# Patient Record
Sex: FEMALE
[De-identification: names, ages, dates, MRNs, and addresses within clinical notes are randomized per-mention and may not be internally consistent; named-entity substitution may affect disease eponyms.]

---

## 2012-07-08 VITALS — DIASTOLIC BLOOD PRESSURE: 50 MMHG | SYSTOLIC BLOOD PRESSURE: 147 MMHG

## 2019-12-09 ENCOUNTER — HOSPITAL ENCOUNTER (OUTPATIENT)
Dept: HOSPITAL 97 - ER | Age: 84
Setting detail: OBSERVATION
LOS: 1 days | Discharge: NURSING, REHAB OR LONG-TERM CARE | End: 2019-12-10
Attending: FAMILY MEDICINE | Admitting: FAMILY MEDICINE
Payer: COMMERCIAL

## 2019-12-09 VITALS — BODY MASS INDEX: 38 KG/M2

## 2019-12-09 DIAGNOSIS — I69.351: ICD-10-CM

## 2019-12-09 DIAGNOSIS — Z79.01: ICD-10-CM

## 2019-12-09 DIAGNOSIS — R07.9: Primary | ICD-10-CM

## 2019-12-09 DIAGNOSIS — N18.2: ICD-10-CM

## 2019-12-09 DIAGNOSIS — E78.5: ICD-10-CM

## 2019-12-09 DIAGNOSIS — I25.10: ICD-10-CM

## 2019-12-09 DIAGNOSIS — Z88.0: ICD-10-CM

## 2019-12-09 DIAGNOSIS — I48.91: ICD-10-CM

## 2019-12-09 DIAGNOSIS — I50.32: ICD-10-CM

## 2019-12-09 DIAGNOSIS — I13.0: ICD-10-CM

## 2019-12-09 DIAGNOSIS — Z95.5: ICD-10-CM

## 2019-12-09 LAB
ALBUMIN SERPL BCP-MCNC: 1.6 G/DL (ref 3.4–5)
ALP SERPL-CCNC: 111 U/L (ref 45–117)
ALT SERPL W P-5'-P-CCNC: 28 U/L (ref 12–78)
ANISOCYTOSIS BLD QL: (no result)
AST SERPL W P-5'-P-CCNC: 66 U/L (ref 15–37)
BLD SMEAR INTERP: (no result)
BUN BLD-MCNC: 73 MG/DL (ref 7–18)
CKMB CREATINE KINASE MB: 2.2 NG/ML (ref 0.3–3.6)
GLUCOSE SERPLBLD-MCNC: 111 MG/DL (ref 74–106)
HCT VFR BLD CALC: 31 % (ref 36–45)
INR BLD: 2.33
LIPASE SERPL-CCNC: 200 U/L (ref 73–393)
LYMPHOCYTES # SPEC AUTO: 0.6 K/UL (ref 0.7–4.9)
MAGNESIUM SERPL-MCNC: 2.4 MG/DL (ref 1.8–2.4)
MORPHOLOGY BLD-IMP: (no result)
PMV BLD: 7.8 FL (ref 7.6–11.3)
POIKILOCYTOSIS BLD QL SMEAR: (no result)
POTASSIUM SERPL-SCNC: 4 MMOL/L (ref 3.5–5.1)
RBC # BLD: 3.8 M/UL (ref 3.86–4.86)
TROPONIN (EMERG DEPT USE ONLY): 0.09 NG/ML (ref 0–0.04)
TROPONIN I: 0.08 NG/ML (ref 0–0.04)

## 2019-12-09 PROCEDURE — 36415 COLL VENOUS BLD VENIPUNCTURE: CPT

## 2019-12-09 PROCEDURE — 84484 ASSAY OF TROPONIN QUANT: CPT

## 2019-12-09 PROCEDURE — 87186 SC STD MICRODIL/AGAR DIL: CPT

## 2019-12-09 PROCEDURE — 83880 ASSAY OF NATRIURETIC PEPTIDE: CPT

## 2019-12-09 PROCEDURE — 82553 CREATINE MB FRACTION: CPT

## 2019-12-09 PROCEDURE — 80048 BASIC METABOLIC PNL TOTAL CA: CPT

## 2019-12-09 PROCEDURE — 85025 COMPLETE CBC W/AUTO DIFF WBC: CPT

## 2019-12-09 PROCEDURE — 87088 URINE BACTERIA CULTURE: CPT

## 2019-12-09 PROCEDURE — 87077 CULTURE AEROBIC IDENTIFY: CPT

## 2019-12-09 PROCEDURE — 99285 EMERGENCY DEPT VISIT HI MDM: CPT

## 2019-12-09 PROCEDURE — 81003 URINALYSIS AUTO W/O SCOPE: CPT

## 2019-12-09 PROCEDURE — 85610 PROTHROMBIN TIME: CPT

## 2019-12-09 PROCEDURE — 82947 ASSAY GLUCOSE BLOOD QUANT: CPT

## 2019-12-09 PROCEDURE — 87086 URINE CULTURE/COLONY COUNT: CPT

## 2019-12-09 PROCEDURE — 84443 ASSAY THYROID STIM HORMONE: CPT

## 2019-12-09 PROCEDURE — 80076 HEPATIC FUNCTION PANEL: CPT

## 2019-12-09 PROCEDURE — 83735 ASSAY OF MAGNESIUM: CPT

## 2019-12-09 PROCEDURE — 80061 LIPID PANEL: CPT

## 2019-12-09 PROCEDURE — 93306 TTE W/DOPPLER COMPLETE: CPT

## 2019-12-09 PROCEDURE — 71045 X-RAY EXAM CHEST 1 VIEW: CPT

## 2019-12-09 PROCEDURE — 82550 ASSAY OF CK (CPK): CPT

## 2019-12-09 PROCEDURE — 93005 ELECTROCARDIOGRAM TRACING: CPT

## 2019-12-09 PROCEDURE — 83690 ASSAY OF LIPASE: CPT

## 2019-12-09 RX ADMIN — AMIODARONE HYDROCHLORIDE SCH MG: 200 TABLET ORAL at 22:32

## 2019-12-09 RX ADMIN — ACETAMINOPHEN PRN MG: 500 TABLET, FILM COATED ORAL at 22:32

## 2019-12-09 RX ADMIN — APIXABAN SCH MG: 2.5 TABLET, FILM COATED ORAL at 22:32

## 2019-12-09 NOTE — ER
Nurse's Notes                                                                                     

 [facility]                                                                 

Name: Nic Plummer                                                                             

Age: 84 yrs                                                                                       

Sex: Female                                                                                       

: 1935                                                                                   

MRN: [id number]                                                                                   

Arrival Date: 2019                                                                          

Time: 13:35                                                                                       

Account#: [id number]                                                                            

Bed 26                                                                                            

Private MD:                                                                                       

Diagnosis: Chest pain, unspecified;Angina pectoris;Anemia, unspecified;Unspecified kidney         

  failure;Urinary tract infection, site not specified                                             

                                                                                                  

Presentation:                                                                                     

                                                                                             

13:38 Presenting complaint: EMS states: Chest pain that began 2 hours ago. Pt had 3 cardiac   ss  

      stents placed 3 weeks ago and had a CVA just after that procedure was completed.            

      Nursing home staff administered Nitro x 3 without relief. Transition of care: patient       

      was received from another setting of care (long-[facility] care [facility]), [facility]. Onset of symptoms was 2019 at 11:54. Risk Assessment: Do you want      

      to hurt yourself or someone else? Patient reports no desire to harm self or others.         

      Initial Sepsis Screen: Does the patient meet any 2 criteria? No. Patient's initial          

      sepsis screen is negative. Does the patient have a suspected source of infection? No.       

      Patient's initial sepsis screen is negative. Care prior to arrival: SEE TRIAGE NOTE.        

      Failed peripheral access x 1.                                                               

13:38 Method Of Arrival: EMS: [facility]                                                         

13:38 Acuity: KEMAR 2                                                                           ss  

                                                                                                  

Historical:                                                                                       

- Allergies:                                                                                      

13:38 PENICILLINS;                                                                            ss  

- Home Meds:                                                                                      

13:45 amiodarone 200 mg Oral tab 1 tab 2 times per day [Active]; apixaban oral 2.5 mg oral 1  ss  

      tab 2 times per day [Active]; aspirin 81 mg Oral TbEC 1 tab once daily [Active]; Coreg      

      6.25 mg Oral tab 1 tab 2 times per day [Active]; Lipitor 80 mg Oral tab 1 tab once          

      daily [Active]; nitroglycerin 0.4 mg SL subl 1 tab every 5 minutes [Active]; Nystatin       

      Powder [Active]; Tylenol [Active];                                                          

- PMHx:                                                                                           

13:38 CVA; residual R sided weakness; CKD; Atrial Fib; CHF;                                   ss  

- PSHx:                                                                                           

13:38 Heart stents; Knee surgery;                                                             ss  

                                                                                                  

- Immunization history:: Adult Immunizations up to date.                                          

- Social history:: Smoking status: Patient/guardian denies using tobacco.                         

- Ebola Screening: : Patient denies exposure to infectious person Patient denies travel           

  to an Ebola-affected area in the 21 days before illness onset.                                  

- Family history:: not pertinent.                                                                 

                                                                                                  

                                                                                                  

Screening:                                                                                        

15:05 Abuse screen: Denies threats or abuse. Denies injuries from another. Nutritional        rv  

      screening: No deficits noted. Tuberculosis screening: No symptoms or risk factors           

      identified. Fall Risk None identified.                                                      

                                                                                                  

Assessment:                                                                                       

15:02 General: Appears in no apparent distress. Behavior is calm, cooperative. Pain:          rv  

      Complains of pain in chest Pain does not radiate. Pain began suddenly. Neuro: Level of      

      Consciousness is awake, alert, obeys commands, Oriented to person, place, time,             

      situation. Cardiovascular: Patient's skin is warm and dry. Rhythm is atrial                 

      fibrillation. Respiratory: Airway is patent. GI: No signs and/or symptoms were reported     

      involving the gastrointestinal system. : No signs and/or symptoms were reported           

      regarding the genitourinary system. EENT: No signs and/or symptoms were reported            

      regarding the EENT system. Derm: Skin is intact.                                            

18:31 Reassessment: Patient appears in no apparent distress at this time. Patient and/or      rv  

      family updated on plan of care and expected duration. Pain level reassessed. Patient is     

      alert, oriented x 3, equal unlabored respirations, skin warm/dry/pink.                      

                                                                                                  

Vital Signs:                                                                                      

13:38 BP 96 / 69 LA (auto/reg); Pulse 74; Resp 25 S; Temp 97(A); Pulse Ox 100% on R/A; Pain   jp3 

      5/10;                                                                                       

15:03  / 72; Pulse 75; Resp 14 S; Pulse Ox 100% on R/A;                                 ca1 

16:49  / 57; Pulse 75; Resp 12 S; Pulse Ox 100% on R/A;                                 ca1 

17:30  / 44; Pulse 76; Resp 11; Pulse Ox 100% on R/A;                                   rv  

18:15  / 58; Pulse 76; Resp 10; Pulse Ox 97% on R/A;                                    rv  

                                                                                                  

ED Course:                                                                                        

13:35 Patient arrived in ED.                                                                  ss  

13:37 Nicci Santos, RN is Primary Nurse.                                                      ca1 

13:38 Arm band placed on right wrist.                                                         ss  

13:38 Patient maintains SpO2 saturation greater than 95% on room air.                         jp3 

13:39 Placed in gown. Bed in low position. Call light in reach. Side rails up X 1. Side rails jp3 

      up X2. Adult w/ patient. Warm blanket given. Verbal reassurance given. Cardiac monitor      

      on. Pulse ox on. NIBP on.                                                                   

13:41 Triage completed.                                                                       ss  

13:42 Scott Henry MD is Attending Physician.                                             courtney 

13:53 Initial lab(s) drawn, by me, sent to lab. Urine collected: Noyola catheter specimen,     jp3 

      clear, sara colored, EKG done, by ED staff, reviewed by Scott Henry MD. Inserted        

      saline lock: 20 gauge in left antecubital area, using aseptic technique. Blood              

      collected.                                                                                  

14:12 XRAY Chest (1 view) Sent.                                                               jp3 

14:15 X-ray(s) taken.                                                                         jp3 

14:19 XRAY Chest (1 view) In Process Unspecified.                                             EDMS

15:05 Aureliano Beckett, RN is Primary Nurse.                                                  rv  

15:09 CBC Smear Scan Sent.                                                                    rv  

15:54 Scout Wood DO is Hospitalizing Provider.                                           courtney 

17:21 Head of bed elevated. Turned to left side.                                              jp3 

17:27 Pillow given. Diet tray given. Diet: Patient given a heart healthy meal tray.           jp3 

17:50 Diet: Patient given water.                                                              rv  

19:54 No provider procedures requiring assistance completed. Patient admitted, IV remains in  rv  

      place.                                                                                      

                                                                                                  

Administered Medications:                                                                         

No medications were administered                                                                  

                                                                                                  

                                                                                                  

Outcome:                                                                                          

15:56 Decision to Hospitalize by Provider.                                                    [facility] 

19:55 Admitted to Tele accompanied by tech, family with patient, via stretcher, room 403,     rv  

      with chart, Report called to  [facility]                                                    

19:55 Condition: good                                                                             

19:55 Instructed on the need for admit.                                                           

19:57 Patient left the ED.                                                                    rv  

                                                                                                  

Signatures:                                                                                       

Dispatcher [employer]                           EDMS                                                 

Scott Henry MD MD cha Smirch, Shelby, RN                      RN   ss                                                   

Aureliano Beckett, RN                    RN   rv                                                   

Wilfrido Lee                              jp3                                                  

Nicci Santos RN                        RN   ca1                                                  

                                                                                                  

**************************************************************************************************

## 2019-12-09 NOTE — XMS REPORT
Patient Summary Document

 Created on:2019



Patient:MARCUS STERLING

Sex:Female

:1935

External Reference #:[id number]





Demographics







 Address  [address]



   [place] [address]

 

 Home Phone  [phone]

 

 Work Phone  [phone]

 

 Preferred Language  Unknown

 

 Marital Status  Unknown

 

 Bahai Affiliation  Unknown

 

 Race  Unknown

 

 Additional Race(s)  Unavailable



   Unavailable

 

 Ethnic Group  Unknown









Author







 Organization  [facility]

 

 Address  [facility] [address]



   [place] [address]

 

 Phone  [phone]









Support







 Name  Relationship  Address  Phone

 

 ANABEL STERLING  Unavailable  [address]  [phone]



     [place] [address]  

 

 SANDI LOPEZ  Unavailable  [address]  [phone]



     [place] [address]  









Care Team Providers







 Name  Role  Phone

 

 Unavailable  Unavailable  Unavailable









Payers







 Payer Name  Policy Type  Policy Number  Effective Date  Expiration Date







Problems

This patient has no known problems.



Allergies, Adverse Reactions, Alerts







 Allergy Name  Allergy  Status  Severity  Reaction(s)  Onset  Inactive  
Treating  Comments



   Type        Date  Date  Clinician  

 

 Penicillins  DA  Active  MO    2019      



           00:00:0      



           0      

 

 Penicillins  DA  Active  MO    2019      



           00:00:0      



           0      







Medications

This patient has no known medications.



Results







 Test Description  Test Time  Test Comments  Text Results  Atomic Results  
Result Comments









 BASIC METABOLIC PANEL  2019 13:15:00    









   Test Item  Value  Reference Range  Comments









 SODIUM (test code=NA)  126 MMOL/L  137-145  

 

 POTASSIUM (test code=K)  4.8 MMOL/L  3.5-5.1  

 

 CHLORIDE (test code=CL)  90 MMOL/L    

 

 CARBON DIOXIDE (test code=CO2)  25 MMOL/L  22-30  

 

 GLUCOSE (test code=GLU)  135 MG/DL    

 

 BLOOD UREA NITROGEN (test  69 MG/DL  7-17  



 code=BUN)      

 

 GLOMERULAR FILTRATION RATE (test  24    Reporting units: ml/min/1.73 m2



 code=GFR)       (Modified MDRD



       Formula)Reference Range: >



       or=60 ml/min/1.73 m2

 

 CREATININE (test code=CREAT)  2.00 MG/DL  0.52-1.04  

 

 CALCIUM (test code=CA)  8.1 MG/DL  8.4-10.2  



PATIENT REFUSE THE BLOOD DRAW. NOTIFIED PATIENT CARE STAFF:DEMETRIO ON 19 
AT 13 BY [facility] METABOLIC [id number] 13:14:00





 Test Item  Value  Reference Range  Comments

 

 SODIUM (test code=NA)  126 MMOL/L  137-145  

 

 POTASSIUM (test code=K)  4.8 MMOL/L  3.5-5.1  

 

 CHLORIDE (test code=CL)  90 MMOL/L    

 

 CARBON DIOXIDE (test code=CO2)  25 MMOL/L  22-30  

 

 GLUCOSE (test code=GLU)   MG/DL    

 

 BLOOD UREA NITROGEN (test  69 MG/DL  7-17  



 code=BUN)      

 

 GLOMERULAR FILTRATION RATE  24    Reporting units: ml/min/1.73



 (test code=GFR)      m2  (Modified MDRD



       Formula)Reference Range: >



       or=60 ml/min/1.73 m2

 

 CREATININE (test code=CREAT)  2.00 MG/DL  0.52-1.04  

 

 CALCIUM (test code=CA)   MG/DL  8.7-9.7  



PATIENT REFUSE THE BLOOD DRAW. NOTIFIED PATIENT CARE STAFF:DEMETRIO ON 19 
 BY [facility] METABOLIC [id number] 13:11:00





 Test Item  Value  Reference Range  Comments

 

 SODIUM (test code=NA)  126 MMOL/L  137-145  

 

 POTASSIUM (test code=K)  4.8 MMOL/L  3.5-5.1  

 

 CHLORIDE (test code=CL)  90 MMOL/L    

 

 CARBON DIOXIDE (test code=CO2)   MMOL/L  22-30  

 

 GLUCOSE (test code=GLU)   MG/DL    

 

 BLOOD UREA NITROGEN (test code=BUN)   MG/DL  7-17  

 

 GLOMERULAR FILTRATION RATE (test code=GFR)      

 

 CREATININE (test code=CREAT)   MG/DL  0.52-1.04  

 

 CALCIUM (test code=CA)   MG/DL  8.7-9.7  



PATIENT REFUSE THE BLOOD DRAW. NOTIFIED PATIENT CARE STAFF:DEMETRIO ON 19 
 BY [facility] METABOLIC [id number] 17:41:00





 Test Item  Value  Reference Range  Comments

 

 SODIUM (test code=NA)  125 MMOL/L  137-145  

 

 POTASSIUM (test code=K)  4.1 MMOL/L  3.5-5.1  

 

 CHLORIDE (test code=CL)  89 MMOL/L    

 

 CARBON DIOXIDE (test code=CO2)  27 MMOL/L  22-30  

 

 ANION GAP (test code=GAP)  13 MMOL/L  14-24  

 

 GLUCOSE (test code=GLU)  150 MG/DL    

 

 BLOOD UREA NITROGEN (test  66 MG/DL    



 code=BUN)      

 

 GLOMERULAR FILTRATION RATE  22    Reporting units: ml/min/1.73



 (test code=GFR)      m2  (Modified MDRD



       Formula)Reference Range: >



       or=60 ml/min/1.73 m2

 

 CREATININE (test code=CREAT)  2.10 MG/DL  0.52-1.04  

 

 CALCIUM (test code=CA)  7.8 MG/DL  8.4-10.2  



BASIC METABOLIC [id number] 17:35:00





 Test Item  Value  Reference Range  Comments

 

 SODIUM (test code=NA)  125 MMOL/L  137-145  

 

 POTASSIUM (test code=K)  4.1 MMOL/L  3.5-5.1  

 

 CHLORIDE (test code=CL)  89 MMOL/L    

 

 CARBON DIOXIDE (test code=CO2)   MMOL/L  22-30  

 

 GLUCOSE (test code=GLU)   MG/DL    

 

 BLOOD UREA NITROGEN (test   MG/DL    



 code=BUN)      

 

 GLOMERULAR FILTRATION RATE  22    Reporting units: ml/min/1.73



 (test code=GFR)      m2  (Modified MDRD



       Formula)Reference Range: >



       or=60 ml/min/1.73 m2

 

 CREATININE (test code=CREAT)  2.10 MG/DL  0.52-1.04  

 

 CALCIUM (test code=CA)   MG/DL  8.7-9.7  



BASIC METABOLIC [id number] 17:33:00





 Test Item  Value  Reference Range  Comments

 

 SODIUM (test code=NA)  125 MMOL/L  137-145  

 

 POTASSIUM (test code=K)  4.1 MMOL/L  3.5-5.1  

 

 CHLORIDE (test code=CL)  89 MMOL/L    

 

 CARBON DIOXIDE (test code=CO2)   MMOL/L  22-30  

 

 GLUCOSE (test code=GLU)   MG/DL    

 

 BLOOD UREA NITROGEN (test code=BUN)   MG/DL  7-17  

 

 GLOMERULAR FILTRATION RATE (test code=GFR)      

 

 CREATININE (test code=CREAT)   MG/DL  0.52-1.04  

 

 CALCIUM (test code=CA)   MG/DL  8.7-9.7  



BASIC METABOLIC [id number] 17:32:00





 Test Item  Value  Reference Range  Comments

 

 SODIUM (test code=NA)   MMOL/L  137-145  

 

 POTASSIUM (test code=K)   MMOL/L  3.5-5.1  

 

 CHLORIDE (test code=CL)  89 MMOL/L    

 

 CARBON DIOXIDE (test code=CO2)   MMOL/L  22-30  

 

 GLUCOSE (test code=GLU)   MG/DL    

 

 BLOOD UREA NITROGEN (test code=BUN)   MG/DL  7-17  

 

 GLOMERULAR FILTRATION RATE (test code=GFR)      

 

 CREATININE (test code=CREAT)   MG/DL  0.52-1.04  

 

 CALCIUM (test code=CA)   MG/DL  8.7-9.7  



GLUCOSE BEDSIDE [id number] 16:31:00





 Test Item  Value  Reference Range  Comments

 

 GLUCOSE BEDSIDE TESTING (test code=GLUBED)  161 MG/DL  60-99  



GLUCOSE BEDSIDE [id number] 11:50:00





 Test Item  Value  Reference Range  Comments

 

 GLUCOSE BEDSIDE TESTING (test code=GLUBED)  121 MG/DL  60-99  



GLUCOSE BEDSIDE [id number] 11:36:00





 Test Item  Value  Reference Range  Comments

 

 GLUCOSE BEDSIDE TESTING (test code=GLUBED)  123 MG/DL  60-99  



GLUCOSE BEDSIDE [id number] 09:56:00





 Test Item  Value  Reference Range  Comments

 

 GLUCOSE BEDSIDE TESTING (test code=GLUBED)  161 MG/DL  60-99  



GLUCOSE BEDSIDE [id number] 09:56:00





 Test Item  Value  Reference Range  Comments

 

 GLUCOSE BEDSIDE TESTING (test code=GLUBED)  143 MG/DL  60-99  



BASIC METABOLIC [id number] 05:12:00





 Test Item  Value  Reference Range  Comments

 

 SODIUM (test code=NA)  125 MMOL/L  137-145  

 

 POTASSIUM (test code=K)  4.3 MMOL/L  3.5-5.1  

 

 CHLORIDE (test code=CL)  88 MMOL/L    

 

 CARBON DIOXIDE (test code=CO2)  28 MMOL/L  22-30  

 

 ANION GAP (test code=GAP)  13 MMOL/L  14-24  

 

 GLUCOSE (test code=GLU)  117 MG/DL    

 

 BLOOD UREA NITROGEN (test  65 MG/DL  7-17  



 code=BUN)      

 

 GLOMERULAR FILTRATION RATE  19    Reporting units: ml/min/1.73



 (test code=GFR)      m2  (Modified MDRD



       Formula)Reference Range: >



       or=60 ml/min/1.73 m2

 

 CREATININE (test code=CREAT)  2.40 MG/DL  0.52-1.04  

 

 CALCIUM (test code=CA)  8.0 MG/DL  8.4-10.2  



BASIC METABOLIC [id number] 05:01:00





 Test Item  Value  Reference Range  Comments

 

 SODIUM (test code=NA)  125 MMOL/L  137-145  

 

 POTASSIUM (test code=K)  4.3 MMOL/L  3.5-5.1  

 

 CHLORIDE (test code=CL)  88 MMOL/L    

 

 CARBON DIOXIDE (test code=CO2)   MMOL/L  22-30  

 

 GLUCOSE (test code=GLU)   MG/DL    

 

 BLOOD UREA NITROGEN (test   MG/DL  7-17  



 code=BUN)      

 

 GLOMERULAR FILTRATION RATE  19    Reporting units: ml/min/1.73



 (test code=GFR)      m2  (Modified MDRD



       Formula)Reference Range: >



       or=60 ml/min/1.73 m2

 

 CREATININE (test code=CREAT)  2.40 MG/DL  0.52-1.04  

 

 CALCIUM (test code=CA)   MG/DL  8.7-9.7  



BASIC METABOLIC [id number] 04:59:00





 Test Item  Value  Reference Range  Comments

 

 SODIUM (test code=NA)  125 MMOL/L  137-145  

 

 POTASSIUM (test code=K)  4.3 MMOL/L  3.5-5.1  

 

 CHLORIDE (test code=CL)  88 MMOL/L    

 

 CARBON DIOXIDE (test code=CO2)   MMOL/L  22-30  

 

 GLUCOSE (test code=GLU)   MG/DL    

 

 BLOOD UREA NITROGEN (test code=BUN)   MG/DL  7-17  

 

 GLOMERULAR FILTRATION RATE (test code=GFR)      

 

 CREATININE (test code=CREAT)   MG/DL  0.52-1.04  

 

 CALCIUM (test code=CA)   MG/DL  8.7-9.7  



BASIC METABOLIC [id number] 19:19:00





 Test Item  Value  Reference Range  Comments

 

 SODIUM (test code=NA)  123 MMOL/L  137-145  

 

 POTASSIUM (test code=K)  4.7 MMOL/L  3.5-5.1  

 

 CHLORIDE (test code=CL)  86 MMOL/L    

 

 CARBON DIOXIDE (test code=CO2)  26 MMOL/L  22-30  

 

 GLUCOSE (test code=GLU)  143 MG/DL    

 

 BLOOD UREA NITROGEN (test  68 MG/DL  7-17  



 code=BUN)      

 

 GLOMERULAR FILTRATION RATE  24    Reporting units: ml/min/1.73



 (test code=GFR)      m2  (Modified MDRD



       Formula)Reference Range: >



       or=60 ml/min/1.73 m2

 

 CREATININE (test code=CREAT)  2.00 MG/DL  0.52-1.04  

 

 CALCIUM (test code=CA)  8.1 MG/DL  8.4-10.2  



BASIC METABOLIC [id number] 19:18:00





 Test Item  Value  Reference Range  Comments

 

 SODIUM (test code=NA)  123 MMOL/L  137-145  

 

 POTASSIUM (test code=K)  4.7 MMOL/L  3.5-5.1  

 

 CHLORIDE (test code=CL)  86 MMOL/L    

 

 CARBON DIOXIDE (test code=CO2)  26 MMOL/L  22-30  

 

 GLUCOSE (test code=GLU)   MG/DL    

 

 BLOOD UREA NITROGEN (test   MG/DL  7-17  



 code=BUN)      

 

 GLOMERULAR FILTRATION RATE  24    Reporting units: ml/min/1.73



 (test code=GFR)      m2  (Modified MDRD



       Formula)Reference Range: >



       or=60 ml/min/1.73 m2

 

 CREATININE (test code=CREAT)  2.00 MG/DL  0.52-1.04  

 

 CALCIUM (test code=CA)   MG/DL  8.7-9.7  



BASIC METABOLIC [id number] 19:16:00





 Test Item  Value  Reference Range  Comments

 

 SODIUM (test code=NA)  123 MMOL/L  137-145  

 

 POTASSIUM (test code=K)  4.7 MMOL/L  3.5-5.1  

 

 CHLORIDE (test code=CL)  86 MMOL/L    

 

 CARBON DIOXIDE (test code=CO2)   MMOL/L  22-30  

 

 GLUCOSE (test code=GLU)   MG/DL    

 

 BLOOD UREA NITROGEN (test code=BUN)   MG/DL  7-17  

 

 GLOMERULAR FILTRATION RATE (test code=GFR)      

 

 CREATININE (test code=CREAT)   MG/DL  0.52-1.04  

 

 CALCIUM (test code=CA)   MG/DL  8.7-9.7  



BASIC METABOLIC [id number] 19:15:00





 Test Item  Value  Reference Range  Comments

 

 SODIUM (test code=NA)  123 MMOL/L  137-145  

 

 POTASSIUM (test code=K)   MMOL/L  3.5-5.1  

 

 CHLORIDE (test code=CL)  86 MMOL/L    

 

 CARBON DIOXIDE (test code=CO2)   MMOL/L  22-30  

 

 GLUCOSE (test code=GLU)   MG/DL    

 

 BLOOD UREA NITROGEN (test code=BUN)   MG/DL  7-17  

 

 GLOMERULAR FILTRATION RATE (test code=GFR)      

 

 CREATININE (test code=CREAT)   MG/DL  0.52-1.04  

 

 CALCIUM (test code=CA)   MG/DL  8.7-9.7  



GLUCOSE BEDSIDE [id number] 16:02:00





 Test Item  Value  Reference Range  Comments

 

 GLUCOSE BEDSIDE TESTING (test code=GLUBED)  132 MG/DL  60-99  



UR SODIUM   [id number] 14:29:00





 Test Item  Value  Reference Range  Comments

 

 UR SODIUM RANDOM (test code=TERA)  < 5 MMOL/L    

 

 UR POTASSIUM RANDOM (test code=KU)  26.4 MMOL/L    



UR OSMOLALITY [id number] 14:29:00





 Test Item  Value  Reference Range  Comments

 

 UR OSMOLALITY RANDOM (test code=OSMOU)  290.5 MOS/KG  300-1200  



UR SODIUM   [id number] 13:20:00





 Test Item  Value  Reference Range  Comments

 

 UR SODIUM RANDOM (test code=TERA)  < 5 MMOL/L    

 

 UR POTASSIUM RANDOM (test code=KU)  26.4 MMOL/L    



UR OSMOLALITY [id number] 13:20:00





 Test Item  Value  Reference Range  Comments

 

 UR OSMOLALITY RANDOM (test code=OSMOU)   MOS/KG  300-1200  



UR CHLORIDE [id number] 13:17:00





 Test Item  Value  Reference Range  Comments

 

 UR CHLORIDE RANDOM (test code=CLU)  < 15 MMOL/L    



GLUCOSE BEDSIDE [id number] 11:47:00





 Test Item  Value  Reference Range  Comments

 

 GLUCOSE BEDSIDE TESTING (test code=GLUBED)  114 MG/DL  60-99  



GLUCOSE BEDSIDE [id number] 08:30:00





 Test Item  Value  Reference Range  Comments

 

 GLUCOSE BEDSIDE TESTING (test code=GLUBED)  131 MG/DL  60-99  



[id number] 00:47:00





 Test Item  Value  Reference Range  Comments

 

 SODIUM (test code=NA)  122 MMOL/L  137-145  



Specimen comments:     PLEASE NOTIFY ME SODIUM RESULTSGLUCOSE BEDSIDE 
[id number] 00:25:00





 Test Item  Value  Reference Range  Comments

 

 GLUCOSE BEDSIDE TESTING (test code=GLUBED)  130 MG/DL  60-99  



GLUCOSE BEDSIDE [id number] 19:18:00





 Test Item  Value  Reference Range  Comments

 

 GLUCOSE BEDSIDE TESTING (test code=GLUBED)  147 MG/DL  60-99  



[id number] 19:07:00





 Test Item  Value  Reference Range  Comments

 

 SODIUM (test code=NA)  123 MMOL/L  137-145  



Specimen comments:     PLEASE NOTIFY ME SODIUM RESULTSGLUCOSE BEDSIDE 
[id number] 12:24:00





 Test Item  Value  Reference Range  Comments

 

 GLUCOSE BEDSIDE TESTING (test code=GLUBED)  167 MG/DL  60-99  



[id number] 10:32:00





 Test Item  Value  Reference Range  Comments

 

 SODIUM (test code=NA)  123 MMOL/L  137-145  



Specimen comments:     PLEASE NOTIFY ME SODIUM RESULTSBASIC METABOLIC [id number] 07:19:00





 Test Item  Value  Reference Range  Comments

 

 SODIUM (test code=NA)  120 MMOL/L  137-145  CALLED TO [facility].O& READBACK



       ON 19 AT 0719 BY



       Toi Yuan

 

 POTASSIUM (test code=K)  4.9 MMOL/L  3.5-5.1  

 

 CHLORIDE (test code=CL)  84 MMOL/L    

 

 CARBON DIOXIDE (test code=CO2)  25 MMOL/L  22-30  

 

 ANION GAP (test code=GAP)  16 MMOL/L  14-24  

 

 GLUCOSE (test code=GLU)  137 MG/DL    

 

 BLOOD UREA NITROGEN (test  67 MG/DL  7-17  



 code=BUN)      

 

 GLOMERULAR FILTRATION RATE  24    Reporting units: ml/min/1.73



 (test code=GFR)      m2  (Modified MDRD



       Formula)Reference Range: >



       or=60 ml/min/1.73 m2

 

 CREATININE (test code=CREAT)  2.00 MG/DL  0.52-1.04  

 

 CALCIUM (test code=CA)  7.7 MG/DL  8.4-10.2  



[id number] 06:54:00





 Test Item  Value  Reference Range  Comments

 

 SODIUM (test code=NA)  121 MMOL/L  137-145  



Specimen comments:     PLEASE NOTIFY ME SODIUM RESULTSCBC W/AUTO [id number] 
06:47:00





 Test Item  Value  Reference Range  Comments

 

 WHITE BLOOD CELL (test code=WBC)  10.5 K/MM3  3.8-9.8  

 

 RED BLOOD CELL (test code=RBC)  3.64 M/MM3  3.58-4.97  

 

 HEMOGLOBIN (test code=HGB)  9.5 G/DL  11.2-14.9  

 

 HEMATOCRIT (test code=HCT)  30.1 %  33.2-43.5  

 

 MEAN CELL VOLUME (test code=MCV)  83 fL  80.7-99.1  

 

 MEAN CELL HGB (test code=MCH)  26.1 pg  27.0-34.1  

 

 MEAN CELL HGB CONCETRATION (test code=MCHC)  31.6 %  32.2-35.7  

 

 RED CELL DISTRIBUTION WIDTH (test code=RDW)  16.8 %  12.1-15.2  

 

 PLATELET COUNT (test code=PLT)  255 K/MM3  129-368  

 

 MEAN PLATELET VOLUME (test code=MPV)  10.3 fl  7.4-10.4  

 

 NEUTROPHIL % (test code=NT%)  80.7 %  43-75  

 

 IMMATURE GRANULOCYTE % (test code=IG%)  1.0 %  0.0-2.0  

 

 LYMPHOCYTE % (test code=LY%)  7.9 %  14-44  

 

 MONOCYTE % (test code=MO%)  9.5 %  4-13  

 

 EOSINOPHIL % (test code=EO%)  0.8 %  0-6  

 

 BASOPHIL % (test code=BA%)  0.1 %  0-2  

 

 NUCLEATED RBC % (test code=NRBC%)  0.2 %  0-1.0  

 

 NEUTROPHIL # (test code=NT#)  8.48 K/mm3  2.0-7.6  

 

 IMMATURE GRANULOCYTE # (test code=IG#)  0.10 x10 3/uL  0-0.03  

 

 LYMPHOCYTE # (test code=LY#)  0.83 K/mm3  1.0-3.8  

 

 MONOCYTE # (test code=MO#)  1.00 K/mm3  0.1-0.8  

 

 EOSINOPHIL # (test code=EO#)  0.08 K/mm3  0.0-0.2  

 

 BASOPHIL # (test code=BA#)  0.01 K/mm3  0.0-0.2  

 

 NUCLEATED RBC # (test code=NRBC#)  0.02 K/mm3  0.0-0.1  



[id number] 01:27:00





 Test Item  Value  Reference Range  Comments

 

 SODIUM (test code=NA)  120 MMOL/L  137-145  CALLED TO DANIELLE ESTEVEZ& READBACK ON



       19 AT 0127 BY



       Oral Petty



Specimen comments:     PLEASE NOTIFY ME SODIUM [id number] 22:01:00





 Test Item  Value  Reference Range  Comments

 

 SODIUM (test code=NA)  121 MMOL/L  137-145  



Specimen comments:     PLEASE NOTIFY ME SODIUM RESULTSGLUCOSE BEDSIDE 
[id number] 15:57:00





 Test Item  Value  Reference Range  Comments

 

 GLUCOSE BEDSIDE TESTING (test code=GLUBED)  174 MG/DL  60-99  



[id number] 12:44:00





 Test Item  Value  Reference Range  Comments

 

 SODIUM (test code=NA)  121 MMOL/L  137-145  



Specimen comments:     PLEASE NOTIFY ME SODIUM RESULTSGLUCOSE BEDSIDE 
[id number] 12:16:00





 Test Item  Value  Reference Range  Comments

 

 GLUCOSE BEDSIDE TESTING (test code=GLUBED)  161 MG/DL  60-99  



GLUCOSE BEDSIDE [id number] 09:57:00





 Test Item  Value  Reference Range  Comments

 

 GLUCOSE BEDSIDE TESTING (test code=GLUBED)  216 MG/DL  60-99  



GLUCOSE BEDSIDE [id number] 08:22:00





 Test Item  Value  Reference Range  Comments

 

 GLUCOSE BEDSIDE TESTING (test code=GLUBED)  153 MG/DL  60-99  



BASIC METABOLIC [id number] 06:50:00





 Test Item  Value  Reference Range  Comments

 

 SODIUM (test code=NA)  121 MMOL/L  137-145  

 

 POTASSIUM (test code=K)  4.1 MMOL/L  3.5-5.1  

 

 CHLORIDE (test code=CL)  82 MMOL/L    

 

 CARBON DIOXIDE (test code=CO2)  27 MMOL/L  22-30  

 

 ANION GAP (test code=GAP)  16 MMOL/L  14-24  

 

 GLUCOSE (test code=GLU)   MG/DL    

 

 BLOOD UREA NITROGEN (test   MG/DL  7-17  



 code=BUN)      

 

 GLOMERULAR FILTRATION RATE  18    Reporting units: ml/min/1.73



 (test code=GFR)      m2  (Modified MDRD



       Formula)Reference Range: >



       or=60 ml/min/1.73 m2

 

 CREATININE (test code=CREAT)  2.50 MG/DL  0.52-1.04  

 

 CALCIUM (test code=CA)   MG/DL  8.7-9.7  



BASIC METABOLIC [id number] 06:50:00





 Test Item  Value  Reference Range  Comments

 

 SODIUM (test code=NA)  121 MMOL/L  137-145  

 

 POTASSIUM (test code=K)  4.1 MMOL/L  3.5-5.1  

 

 CHLORIDE (test code=CL)  82 MMOL/L    

 

 CARBON DIOXIDE (test code=CO2)  27 MMOL/L  22-30  

 

 ANION GAP (test code=GAP)  16 MMOL/L  14-24  

 

 GLUCOSE (test code=GLU)  156 MG/DL    

 

 BLOOD UREA NITROGEN (test  61 MG/DL  7-17  



 code=BUN)      

 

 GLOMERULAR FILTRATION RATE  18    Reporting units: ml/min/1.73



 (test code=GFR)      m2  (Modified MDRD



       Formula)Reference Range: >



       or=60 ml/min/1.73 m2

 

 CREATININE (test code=CREAT)  2.50 MG/DL  0.52-1.04  

 

 CALCIUM (test code=CA)  7.5 MG/DL  8.4-10.2  



BASIC METABOLIC [id number] 06:49:00





 Test Item  Value  Reference Range  Comments

 

 SODIUM (test code=NA)  121 MMOL/L  137-145  

 

 POTASSIUM (test code=K)  4.1 MMOL/L  3.5-5.1  

 

 CHLORIDE (test code=CL)  82 MMOL/L    

 

 CARBON DIOXIDE (test code=CO2)   MMOL/L  22-30  

 

 GLUCOSE (test code=GLU)   MG/DL    

 

 BLOOD UREA NITROGEN (test   MG/DL  17  



 code=BUN)      

 

 GLOMERULAR FILTRATION RATE  18    Reporting units: ml/min/1.73



 (test code=GFR)      m2  (Modified MDRD



       Formula)Reference Range: >



       or=60 ml/min/1.73 m2

 

 CREATININE (test code=CREAT)  2.50 MG/DL  0.52-1.04  

 

 CALCIUM (test code=CA)   MG/DL  8.7-9.7  



BASIC METABOLIC [id number] 06:47:00





 Test Item  Value  Reference Range  Comments

 

 SODIUM (test code=NA)  121 MMOL/L  137-145  

 

 POTASSIUM (test code=K)  4.1 MMOL/L  3.5-5.1  

 

 CHLORIDE (test code=CL)  82 MMOL/L    

 

 CARBON DIOXIDE (test code=CO2)   MMOL/L  22-30  

 

 GLUCOSE (test code=GLU)   MG/DL    

 

 BLOOD UREA NITROGEN (test code=BUN)   MG/DL  7-17  

 

 GLOMERULAR FILTRATION RATE (test code=GFR)      

 

 CREATININE (test code=CREAT)   MG/DL  0.52-1.04  

 

 CALCIUM (test code=CA)   MG/DL  8.7-9.7  



BASIC METABOLIC [id number] 06:46:00





 Test Item  Value  Reference Range  Comments

 

 SODIUM (test code=NA)   MMOL/L  137-145  

 

 POTASSIUM (test code=K)   MMOL/L  3.5-5.1  

 

 CHLORIDE (test code=CL)  82 MMOL/L    

 

 CARBON DIOXIDE (test code=CO2)   MMOL/L  22-30  

 

 GLUCOSE (test code=GLU)   MG/DL    

 

 BLOOD UREA NITROGEN (test code=BUN)   MG/DL  7-17  

 

 GLOMERULAR FILTRATION RATE (test code=GFR)      

 

 CREATININE (test code=CREAT)   MG/DL  0.52-1.04  

 

 CALCIUM (test code=CA)   MG/DL  8.7-9.7  



CBC W/AUTO [id number] 06:22:00





 Test Item  Value  Reference Range  Comments

 

 WHITE BLOOD CELL (test code=WBC)  12.3 K/MM3  3.8-9.8  

 

 RED BLOOD CELL (test code=RBC)  3.62 M/MM3  3.58-4.97  

 

 HEMOGLOBIN (test code=HGB)  9.6 G/DL  11.2-14.9  

 

 HEMATOCRIT (test code=HCT)  30.6 %  33.2-43.5  

 

 MEAN CELL VOLUME (test code=MCV)  85 fL  80.7-99.1  

 

 MEAN CELL HGB (test code=MCH)  26.5 pg  27.0-34.1  

 

 MEAN CELL HGB CONCETRATION (test code=MCHC)  31.4 %  32.2-35.7  

 

 RED CELL DISTRIBUTION WIDTH (test code=RDW)  16.6 %  12.1-15.2  

 

 PLATELET COUNT (test code=PLT)  271 K/MM3  129-368  

 

 MEAN PLATELET VOLUME (test code=MPV)  10.6 fl  7.4-10.4  

 

 NEUTROPHIL % (test code=NT%)  80.2 %  43-75  

 

 IMMATURE GRANULOCYTE % (test code=IG%)  1.1 %  0.0-2.0  

 

 LYMPHOCYTE % (test code=LY%)  7.8 %  14-44  

 

 MONOCYTE % (test code=MO%)  10.0 %  4-13  

 

 EOSINOPHIL % (test code=EO%)  0.8 %  0-6  

 

 BASOPHIL % (test code=BA%)  0.1 %  0-2  

 

 NUCLEATED RBC % (test code=NRBC%)  0.2 %  0-1.0  

 

 NEUTROPHIL # (test code=NT#)  9.82 K/mm3  2.0-7.6  

 

 IMMATURE GRANULOCYTE # (test code=IG#)  0.14 x10 3/uL  0-0.03  

 

 LYMPHOCYTE # (test code=LY#)  0.96 K/mm3  1.0-3.8  

 

 MONOCYTE # (test code=MO#)  1.22 K/mm3  0.1-0.8  

 

 EOSINOPHIL # (test code=EO#)  0.10 K/mm3  0.0-0.2  

 

 BASOPHIL # (test code=BA#)  0.01 K/mm3  0.0-0.2  

 

 NUCLEATED RBC # (test code=NRBC#)  0.03 K/mm3  0.0-0.1  



GLUCOSE BEDSIDE [id number] 20:40:00





 Test Item  Value  Reference Range  Comments

 

 GLUCOSE BEDSIDE TESTING (test code=GLUBED)  212 MG/DL  60-99  



BASIC METABOLIC [id number] 19:03:00





 Test Item  Value  Reference Range  Comments

 

 SODIUM (test code=NA)  122 MMOL/L  137-145  

 

 POTASSIUM (test code=K)  4.3 MMOL/L  3.5-5.1  

 

 CHLORIDE (test code=CL)  83 MMOL/L    

 

 CARBON DIOXIDE (test code=CO2)  28 MMOL/L  22-30  

 

 GLUCOSE (test code=GLU)  182 MG/DL    

 

 BLOOD UREA NITROGEN (test  63 MG/DL  7-17  



 code=BUN)      

 

 GLOMERULAR FILTRATION RATE  18    Reporting units: ml/min/1.73



 (test code=GFR)      m2  (Modified MDRD



       Formula)Reference Range: >



       or=60 ml/min/1.73 m2

 

 CREATININE (test code=CREAT)  2.50 MG/DL  0.52-1.04  

 

 CALCIUM (test code=CA)  7.6 MG/DL  8.4-10.2  



BASIC METABOLIC [id number] 19:00:00





 Test Item  Value  Reference Range  Comments

 

 SODIUM (test code=NA)  122 MMOL/L  137-145  

 

 POTASSIUM (test code=K)  4.3 MMOL/L  3.5-5.1  

 

 CHLORIDE (test code=CL)  83 MMOL/L    

 

 CARBON DIOXIDE (test code=CO2)   MMOL/L  22-30  

 

 GLUCOSE (test code=GLU)   MG/DL    

 

 BLOOD UREA NITROGEN (test code=BUN)   MG/DL  7-17  

 

 GLOMERULAR FILTRATION RATE (test code=GFR)      

 

 CREATININE (test code=CREAT)   MG/DL  0.52-1.04  

 

 CALCIUM (test code=CA)   MG/DL  8.7-9.7  



GLUCOSE BEDSIDE [id number] 17:10:00





 Test Item  Value  Reference Range  Comments

 

 GLUCOSE BEDSIDE TESTING (test code=GLUBED)  185 MG/DL  60-99  



GLUCOSE BEDSIDE [id number] 11:28:00





 Test Item  Value  Reference Range  Comments

 

 GLUCOSE BEDSIDE TESTING (test code=GLUBED)  181 MG/DL  60-99  



- XR [id number] 09:25:00  Patient Name: MARCUS STERLING             
   Unit No: [id number]          EXAMS:                              CPT CODE:  
     [id number] XR CHEST 2 V                               [id number]                
    HISTORY: Follow-up pleural effusion               Location code: B2    
FINDINGS: Frontal view of the chest demonstrates a moderately enlarged       
cardiomediastinal silhouette.  Central venous congestion with mild       
interstitial edema.  The trachea is midline.  Small right effusion.        No 
pneumothorax.  Aortic atherosclerosis.  The bones are intact.       IMPRESSION: 
Moderate cardiomegaly and central venous congestion with a         small right 
effusion.          ** Electronically Signed by OVIDIO Leal on 2019 at 
0925 **    Reported and signed by: Scott Leal M.D.                              
      CC: Gustavo Sosa;Berny No MD                  Technologist: La Nguyen (RT)(R); Matthew David (RT)(R)          Transcrpt Date/Tm/Trnsp: 2019 (09) [id number].[id number]                 Orig Print D/T: S: 2019 (928)     
                [facility]                           NAME: MARCUS STERLING    
            [id number]                      PHYS: Berny Rush MD    
        [place] [address]     : 1935 AGE: 84      SEX: F               
                        ACCT NO: [id number]: [id number] A      PHONE #: 
[phone]               EXAM DATE: 2019 STATUS: ADM IN     FAX #: 
[phone]               RADIOLOGY NO:            PAGE  1                    
   Signed ReportGLUCOSE BEDSIDE [id number] 07:54:00





 Test Item  Value  Reference Range  Comments

 

 GLUCOSE BEDSIDE TESTING (test code=GLUBED)  141 MG/DL  60-99  



BASIC METABOLIC [id number] 07:14:00





 Test Item  Value  Reference Range  Comments

 

 SODIUM (test code=NA)  122 MMOL/L  137-145  

 

 POTASSIUM (test code=K)  4.1 MMOL/L  3.5-5.1  

 

 CHLORIDE (test code=CL)  84 MMOL/L    

 

 CARBON DIOXIDE (test code=CO2)  26 MMOL/L  22-30  

 

 GLUCOSE (test code=GLU)  161 MG/DL    

 

 BLOOD UREA NITROGEN (test  60 MG/DL  7-17  



 code=BUN)      

 

 GLOMERULAR FILTRATION RATE  19    Reporting units: ml/min/1.73



 (test code=GFR)      m2  (Modified MDRD



       Formula)Reference Range: >



       or=60 ml/min/1.73 m2

 

 CREATININE (test code=CREAT)  2.40 MG/DL  0.52-1.04  

 

 CALCIUM (test code=CA)  7.5 MG/DL  8.4-10.2  



BASIC METABOLIC [id number] 07:13:00





 Test Item  Value  Reference Range  Comments

 

 SODIUM (test code=NA)  122 MMOL/L  137-145  

 

 POTASSIUM (test code=K)  4.1 MMOL/L  3.5-5.1  

 

 CHLORIDE (test code=CL)  84 MMOL/L    

 

 CARBON DIOXIDE (test code=CO2)   MMOL/L  22-30  

 

 GLUCOSE (test code=GLU)   MG/DL    

 

 BLOOD UREA NITROGEN (test   MG/DL  7-17  



 code=BUN)      

 

 GLOMERULAR FILTRATION RATE  19    Reporting units: ml/min/1.73



 (test code=GFR)      m2  (Modified MDRD



       Formula)Reference Range: >



       or=60 ml/min/1.73 m2

 

 CREATININE (test code=CREAT)  2.40 MG/DL  0.52-1.04  

 

 CALCIUM (test code=CA)   MG/DL  8.7-9.7  



BASIC METABOLIC [id number] 07:11:00





 Test Item  Value  Reference Range  Comments

 

 SODIUM (test code=NA)  122 MMOL/L  137-145  

 

 POTASSIUM (test code=K)  4.1 MMOL/L  3.5-5.1  

 

 CHLORIDE (test code=CL)  84 MMOL/L    

 

 CARBON DIOXIDE (test code=CO2)   MMOL/L  22-30  

 

 GLUCOSE (test code=GLU)   MG/DL    

 

 BLOOD UREA NITROGEN (test code=BUN)   MG/DL  7-17  

 

 GLOMERULAR FILTRATION RATE (test code=GFR)      

 

 CREATININE (test code=CREAT)   MG/DL  0.52-1.04  

 

 CALCIUM (test code=CA)   MG/DL  8.7-9.7  



BASIC METABOLIC [id number] 07:10:00





 Test Item  Value  Reference Range  Comments

 

 SODIUM (test code=NA)   MMOL/L  137-145  

 

 POTASSIUM (test code=K)   MMOL/L  3.5-5.1  

 

 CHLORIDE (test code=CL)  84 MMOL/L    

 

 CARBON DIOXIDE (test code=CO2)   MMOL/L  22-30  

 

 GLUCOSE (test code=GLU)   MG/DL    

 

 BLOOD UREA NITROGEN (test code=BUN)   MG/DL  7-17  

 

 GLOMERULAR FILTRATION RATE (test code=GFR)      

 

 CREATININE (test code=CREAT)   MG/DL  0.52-1.04  

 

 CALCIUM (test code=CA)   MG/DL  8.7-9.7  



CBC W/AUTO [id number] 06:26:00





 Test Item  Value  Reference Range  Comments

 

 WHITE BLOOD CELL (test code=WBC)  10.1 K/MM3  3.8-9.8  

 

 RED BLOOD CELL (test code=RBC)  3.62 M/MM3  3.58-4.97  

 

 HEMOGLOBIN (test code=HGB)  9.5 G/DL  11.2-14.9  

 

 HEMATOCRIT (test code=HCT)  30.8 %  33.2-43.5  

 

 MEAN CELL VOLUME (test code=MCV)  85 fL  80.7-99.1  

 

 MEAN CELL HGB (test code=MCH)  26.2 pg  27.0-34.1  

 

 MEAN CELL HGB CONCETRATION (test code=MCHC)  30.8 %  32.2-35.7  

 

 RED CELL DISTRIBUTION WIDTH (test code=RDW)  16.5 %  12.1-15.2  

 

 PLATELET COUNT (test code=PLT)  280 K/MM3  129-368  

 

 MEAN PLATELET VOLUME (test code=MPV)  10.8 fl  7.4-10.4  

 

 NEUTROPHIL % (test code=NT%)  74.1 %  43-75  

 

 IMMATURE GRANULOCYTE % (test code=IG%)  1.2 %  0.0-2.0  

 

 LYMPHOCYTE % (test code=LY%)  10.1 %  14-44  

 

 MONOCYTE % (test code=MO%)  13.5 %  4-13  

 

 EOSINOPHIL % (test code=EO%)  1.1 %  0-6  

 

 BASOPHIL % (test code=BA%)  0 %  0-2  

 

 NUCLEATED RBC % (test code=NRBC%)  0.2 %  0-1.0  

 

 NEUTROPHIL # (test code=NT#)  7.51 K/mm3  2.0-7.6  

 

 IMMATURE GRANULOCYTE # (test code=IG#)  0.12 x10 3/uL  0-0.03  

 

 LYMPHOCYTE # (test code=LY#)  1.02 K/mm3  1.0-3.8  

 

 MONOCYTE # (test code=MO#)  1.37 K/mm3  0.1-0.8  

 

 EOSINOPHIL # (test code=EO#)  0.11 K/mm3  0.0-0.2  

 

 BASOPHIL # (test code=BA#)  0 K/mm3  0.0-0.2  

 

 NUCLEATED RBC # (test code=NRBC#)  0.02 K/mm3  0.0-0.1  



GLUCOSE BEDSIDE [id number] 20:37:00





 Test Item  Value  Reference Range  Comments

 

 GLUCOSE BEDSIDE TESTING (test code=GLUBED)  254 MG/DL  60-99  



PROTHROMBIN [id number] 17:12:00





 Test Item  Value  Reference Range  Comments

 

 PROTHROMBIN TIME PATIENT (test  15.5 SECONDS  9.6-11.6  



 code=PTP)      

 

 INTERNATIONAL NORMAL RATIO  1.5  0.8-1.1  The INR is to be used only



 (test code=INR)      for monitoring oral



       anticoagulanttherapy.



       INDICATION



       



       INR



       VALUE---------------------



       --------------------------



       ------------1.



       Prophylaxis, deep venous



       thrombosis,    including



       high risk surgery.



                2.0 - 3.0 2.



       Prophylaxis, deep venous



       thrombosis,    hip



       surgery, treatment for



       deep venous    thrombosis



       or pulmonary prevention of



          systemic embolism in



       patients with    valvular



       heart disease, atrial



       fibrillation,    tissue



       heart valve, or acute



       myocardial    infarction.



       



            2.0 - 3.0 3.



       Mechanical prosthesis



       heart valves,    recurrent



       systemic embolism.



               3.0 - 4.5



CBC W/AUTO [id number] 17:00:00





 Test Item  Value  Reference Range  Comments

 

 WHITE BLOOD CELL (test code=WBC)  11.4 K/MM3  3.8-9.8  

 

 RED BLOOD CELL (test code=RBC)  3.80 M/MM3  3.58-4.97  

 

 HEMOGLOBIN (test code=HGB)  10.1 G/DL  11.2-14.9  

 

 HEMATOCRIT (test code=HCT)  32.5 %  33.2-43.5  

 

 MEAN CELL VOLUME (test code=MCV)  86 fL  80.7-99.1  

 

 MEAN CELL HGB (test code=MCH)  26.6 pg  27.0-34.1  

 

 MEAN CELL HGB CONCETRATION (test code=MCHC)  31.1 %  32.2-35.7  

 

 RED CELL DISTRIBUTION WIDTH (test code=RDW)  16.8 %  12.1-15.2  

 

 PLATELET COUNT (test code=PLT)  287 K/MM3  129-368  

 

 MEAN PLATELET VOLUME (test code=MPV)  10.5 fl  7.4-10.4  

 

 NEUTROPHIL % (test code=NT%)  76.6 %  43-75  

 

 IMMATURE GRANULOCYTE % (test code=IG%)  1.1 %  0.0-2.0  

 

 LYMPHOCYTE % (test code=LY%)  9.9 %  14-44  

 

 MONOCYTE % (test code=MO%)  11.9 %  4-13  

 

 EOSINOPHIL % (test code=EO%)  0.4 %  0-6  

 

 BASOPHIL % (test code=BA%)  0.1 %  0-2  

 

 NUCLEATED RBC % (test code=NRBC%)  0.2 %  0-1.0  

 

 NEUTROPHIL # (test code=NT#)  8.75 K/mm3  2.0-7.6  

 

 IMMATURE GRANULOCYTE # (test code=IG#)  0.13 x10 3/uL  0-0.03  

 

 LYMPHOCYTE # (test code=LY#)  1.13 K/mm3  1.0-3.8  

 

 MONOCYTE # (test code=MO#)  1.36 K/mm3  0.1-0.8  

 

 EOSINOPHIL # (test code=EO#)  0.04 K/mm3  0.0-0.2  

 

 BASOPHIL # (test code=BA#)  0.01 K/mm3  0.0-0.2  

 

 NUCLEATED RBC # (test code=NRBC#)  0.02 K/mm3  0.0-0.1  



GLUCOSE BEDSIDE [id number] 15:39:00





 Test Item  Value  Reference Range  Comments

 

 GLUCOSE BEDSIDE TESTING (test code=GLUBED)  199 MG/DL  60-99  



URINALYSIS [id number] 14:19:00





 Test Item  Value  Reference Range  Comments

 

 UA COLOR (test code=COLU)  RED  YELLOW  

 

 UA APPEARANCE (test code=APPU)  BLOODY  CLEAR  

 

 UA GLUCOSE DIPSTICK (test code=DGLUU)  NORMAL MG/DL  NORMAL  

 

 UA BILIRUBIN DIPSTICK (test code=BILU)  NEGATIVE MG/DL  NEGATIVE  

 

 UA KETONE DIPSTICK (test code=KETU)  NEGATIVE MG/DL  NEGATIVE  

 

 UA SPECIFIC GRAVITY (test code=SGU)  1.020  1.003-1.030  

 

 UA BLOOD DIPSTICK (test code=MELVIN)  250 Andre/mm3  NEGATIVE  

 

 UA PH DIPSTICK (test code=VALARIE)  5.0  5.0-9.0  

 

 UA PROTEIN DIPSTICK (test code=PROU)  100 MG/DL  NEGATIVE  

 

 UA UROBILINIOGEN DIPSTICK (test  NORMAL MG/DL  NORMAL  



 code=URO)      

 

 UA NITRITE DIPSTICK (test code=ANA ROSA)  NEGATIVE  NEGATIVE  

 

 UA LEUKOCYTE ESTERASE DIPSTICK (test  NEGATIVE /mm3  NEGATIVE  



 code=LEUU)      

 

 UA CULTURE NEEDED? (test code=UACULT)  NO, WBC<10 Criteria  Culture Chk  



SOURCE OF URINE: CLEAN CATCHUA [id number] 14:19:00





 Test Item  Value  Reference Range  Comments

 

 UA RBC (test code=RBCU)  >100 RBC/HPF  0-3  

 

 UA WBC (test code=XWBCU)  0-3 WBC/HPF  0-5  

 

 UA EPITHELIAL CELLS (test code=EPIU)  FEW EPI/HPF  FEW  

 

 UA BACTERIA (test code=XBACU)  FEW  NONE  



SOURCE OF URINE: CLEAN CATCHUR SODIUM [id number] 14:19:00





 Test Item  Value  Reference Range  Comments

 

 UR SODIUM RANDOM (test code=TERA)  16 MMOL/L    



SOURCE OF URINE: CLEAN CATCHUR CHLORIDE [id number] 14:19:00





 Test Item  Value  Reference Range  Comments

 

 UR CHLORIDE RANDOM (test code=CLU)  < 15 MMOL/L    



SOURCE OF URINE: CLEAN CATCHUR OSMOLALITY [id number] 14:19:00





 Test Item  Value  Reference Range  Comments

 

 UR OSMOLALITY RANDOM (test code=OSMOU)  418 MOS/KG  300-1200  



SOURCE OF URINE: CLEAN CATCHUR SMEAR EOSINOPHIL [id number] 12:46:00





 Test Item  Value  Reference Range  Comments

 

 UR SMEAR EOSINOPHIL COUNT (test code=EOSCTU)  NONE  RARE  



URINALYSIS [id number] 12:14:00





 Test Item  Value  Reference Range  Comments

 

 UA COLOR (test code=COLU)  RED  YELLOW  

 

 UA APPEARANCE (test code=APPU)  BLOODY  CLEAR  

 

 UA GLUCOSE DIPSTICK (test code=DGLUU)  NORMAL MG/DL  NORMAL  

 

 UA BILIRUBIN DIPSTICK (test code=BILU)  NEGATIVE MG/DL  NEGATIVE  

 

 UA KETONE DIPSTICK (test code=KETU)  NEGATIVE MG/DL  NEGATIVE  

 

 UA SPECIFIC GRAVITY (test code=SGU)  1.020  1.003-1.030  

 

 UA BLOOD DIPSTICK (test code=MELVIN)  250 Andre/mm3  NEGATIVE  

 

 UA PH DIPSTICK (test code=VALARIE)  5.0  5.0-9.0  

 

 UA PROTEIN DIPSTICK (test code=PROU)  100 MG/DL  NEGATIVE  

 

 UA UROBILINIOGEN DIPSTICK (test  NORMAL MG/DL  NORMAL  



 code=URO)      

 

 UA NITRITE DIPSTICK (test code=ANA ROSA)  NEGATIVE  NEGATIVE  

 

 UA LEUKOCYTE ESTERASE DIPSTICK (test  NEGATIVE /mm3  NEGATIVE  



 code=LEUU)      

 

 UA CULTURE NEEDED? (test code=UACULT)  NO, WBC<10 Criteria  Culture Chk  



SOURCE OF URINE: CLEAN CATCHUA [id number] 12:14:00





 Test Item  Value  Reference Range  Comments

 

 UA RBC (test code=RBCU)  >100 RBC/HPF  0-3  

 

 UA WBC (test code=XWBCU)  0-3 WBC/HPF  0-5  

 

 UA EPITHELIAL CELLS (test code=EPIU)  FEW EPI/HPF  FEW  

 

 UA BACTERIA (test code=XBACU)  FEW  NONE  



SOURCE OF URINE: CLEAN CATCHUR SODIUM [id number] 12:14:00





 Test Item  Value  Reference Range  Comments

 

 UR SODIUM RANDOM (test code=TERA)  16 MMOL/L    



SOURCE OF URINE: CLEAN CATCHUR CHLORIDE [id number] 12:14:00





 Test Item  Value  Reference Range  Comments

 

 UR CHLORIDE RANDOM (test code=CLU)  < 15 MMOL/L    



SOURCE OF URINE: CLEAN CATCHUR OSMOLALITY [id number] 12:14:00





 Test Item  Value  Reference Range  Comments

 

 UR OSMOLALITY RANDOM (test code=OSMOU)   MOS/KG  300-1200  



SOURCE OF URINE: CLEAN CATCHURINALYSIS [id number] 12:13:00





 Test Item  Value  Reference Range  Comments

 

 UA COLOR (test code=COLU)  RED  YELLOW  

 

 UA APPEARANCE (test code=APPU)  BLOODY  CLEAR  

 

 UA GLUCOSE DIPSTICK (test code=DGLUU)  NORMAL MG/DL  NORMAL  

 

 UA BILIRUBIN DIPSTICK (test code=BILU)  NEGATIVE MG/DL  NEGATIVE  

 

 UA KETONE DIPSTICK (test code=KETU)  NEGATIVE MG/DL  NEGATIVE  

 

 UA SPECIFIC GRAVITY (test code=SGU)  1.020  1.003-1.030  

 

 UA BLOOD DIPSTICK (test code=MELVIN)  250 Andre/mm3  NEGATIVE  

 

 UA PH DIPSTICK (test code=VALARIE)  5.0  5.0-9.0  

 

 UA PROTEIN DIPSTICK (test code=PROU)  100 MG/DL  NEGATIVE  

 

 UA UROBILINIOGEN DIPSTICK (test  NORMAL MG/DL  NORMAL  



 code=URO)      

 

 UA NITRITE DIPSTICK (test code=ANA ROSA)  NEGATIVE  NEGATIVE  

 

 UA LEUKOCYTE ESTERASE DIPSTICK (test  NEGATIVE /mm3  NEGATIVE  



 code=LEUU)      

 

 UA CULTURE NEEDED? (test code=UACULT)  NO, WBC<10 Criteria  Culture Chk  



SOURCE OF URINE: CLEAN CATCHUA [id number] 12:13:00





 Test Item  Value  Reference Range  Comments

 

 UA RBC (test code=RBCU)  >100 RBC/HPF  0-3  

 

 UA WBC (test code=XWBCU)  0-3 WBC/HPF  0-5  

 

 UA EPITHELIAL CELLS (test code=EPIU)  FEW EPI/HPF  FEW  

 

 UA BACTERIA (test code=XBACU)  FEW  NONE  



SOURCE OF URINE: CLEAN CATCHUR SODIUM [id number] 12:13:00





 Test Item  Value  Reference Range  Comments

 

 UR SODIUM RANDOM (test code=TERA)   MMOL/L    



SOURCE OF URINE: CLEAN CATCHUR CHLORIDE [id number] 12:13:00





 Test Item  Value  Reference Range  Comments

 

 UR CHLORIDE RANDOM (test code=CLU)  < 15 MMOL/L    



SOURCE OF URINE: CLEAN CATCHUR OSMOLALITY [id number] 12:13:00





 Test Item  Value  Reference Range  Comments

 

 UR OSMOLALITY RANDOM (test code=OSMOU)   MOS/KG  300-1200  



SOURCE OF URINE: CLEAN CATCHURINALYSIS [id number] 11:55:00





 Test Item  Value  Reference Range  Comments

 

 UA COLOR (test code=COLU)  RED  YELLOW  

 

 UA APPEARANCE (test code=APPU)  BLOODY  CLEAR  

 

 UA GLUCOSE DIPSTICK (test code=DGLUU)  NORMAL MG/DL  NORMAL  

 

 UA BILIRUBIN DIPSTICK (test code=BILU)  NEGATIVE MG/DL  NEGATIVE  

 

 UA KETONE DIPSTICK (test code=KETU)  NEGATIVE MG/DL  NEGATIVE  

 

 UA SPECIFIC GRAVITY (test code=SGU)  1.020  1.003-1.030  

 

 UA BLOOD DIPSTICK (test code=MELVIN)  250 Andre/mm3  NEGATIVE  

 

 UA PH DIPSTICK (test code=VALARIE)  5.0  5.0-9.0  

 

 UA PROTEIN DIPSTICK (test code=PROU)  100 MG/DL  NEGATIVE  

 

 UA UROBILINIOGEN DIPSTICK (test  NORMAL MG/DL  NORMAL  



 code=URO)      

 

 UA NITRITE DIPSTICK (test code=ANA ROSA)  NEGATIVE  NEGATIVE  

 

 UA LEUKOCYTE ESTERASE DIPSTICK (test  NEGATIVE /mm3  NEGATIVE  



 code=LEUU)      

 

 UA CULTURE NEEDED? (test code=UACULT)  NO, WBC<10 Criteria  Culture Chk  



SOURCE OF URINE: CLEAN CATCHUA [id number] 11:55:00





 Test Item  Value  Reference Range  Comments

 

 UA RBC (test code=RBCU)  >100 RBC/HPF  0-3  

 

 UA WBC (test code=XWBCU)  0-3 WBC/HPF  0-5  

 

 UA EPITHELIAL CELLS (test code=EPIU)  FEW EPI/HPF  FEW  

 

 UA BACTERIA (test code=XBACU)  FEW  NONE  



SOURCE OF URINE: CLEAN CATCHUR SODIUM [id number] 11:55:00





 Test Item  Value  Reference Range  Comments

 

 UR SODIUM RANDOM (test code=TERA)   MMOL/L    



SOURCE OF URINE: CLEAN CATCHUR CHLORIDE [id number] 11:55:00





 Test Item  Value  Reference Range  Comments

 

 UR CHLORIDE RANDOM (test code=CLU)   MMOL/L    



SOURCE OF URINE: CLEAN CATCHUR OSMOLALITY [id number] 11:55:00





 Test Item  Value  Reference Range  Comments

 

 UR OSMOLALITY RANDOM (test code=OSMOU)   MOS/KG  300-1200  



SOURCE OF URINE: CLEAN CATCHURINALYSIS [id number] 11:45:00





 Test Item  Value  Reference Range  Comments

 

 UA COLOR (test code=COLU)  RED  YELLOW  

 

 UA APPEARANCE (test code=APPU)  BLOODY  CLEAR  

 

 UA GLUCOSE DIPSTICK (test code=DGLUU)  NORMAL MG/DL  NORMAL  

 

 UA BILIRUBIN DIPSTICK (test code=BILU)  NEGATIVE MG/DL  NEGATIVE  

 

 UA KETONE DIPSTICK (test code=KETU)  NEGATIVE MG/DL  NEGATIVE  

 

 UA SPECIFIC GRAVITY (test code=SGU)  1.020  1.003-1.030  

 

 UA BLOOD DIPSTICK (test code=MELVIN)  250 Andre/mm3  NEGATIVE  

 

 UA PH DIPSTICK (test code=VALARIE)  5.0  5.0-9.0  

 

 UA PROTEIN DIPSTICK (test code=PROU)  100 MG/DL  NEGATIVE  

 

 UA UROBILINIOGEN DIPSTICK (test code=URO)  NORMAL MG/DL  NORMAL  

 

 UA NITRITE DIPSTICK (test code=ANA ROSA)  NEGATIVE  NEGATIVE  

 

 UA LEUKOCYTE ESTERASE DIPSTICK (test  NEGATIVE /mm3  NEGATIVE  



 code=LEUU)      

 

 UA CULTURE NEEDED? (test code=UACULT)   Criteria  Culture Chk  



SOURCE OF URINE: CLEAN CATCHUA [id number] 11:45:00





 Test Item  Value  Reference Range  Comments

 

 UA RBC (test code=RBCU)   RBC/HPF  0-3  

 

 UA WBC (test code=XWBCU)   WBC/HPF  0-5  

 

 UA EPITHELIAL CELLS (test code=EPIU)   EPI/HPF  FEW  

 

 UA BACTERIA (test code=XBACU)    NONE  



SOURCE OF URINE: CLEAN CATCHUR SODIUM [id number] 11:45:00





 Test Item  Value  Reference Range  Comments

 

 UR SODIUM RANDOM (test code=TERA)   MMOL/L    



SOURCE OF URINE: CLEAN CATCHUR CHLORIDE [id number] 11:45:00





 Test Item  Value  Reference Range  Comments

 

 UR CHLORIDE RANDOM (test code=CLU)   MMOL/L    



SOURCE OF URINE: CLEAN CATCHUR OSMOLALITY [id number] 11:45:00





 Test Item  Value  Reference Range  Comments

 

 UR OSMOLALITY RANDOM (test code=OSMOU)   MOS/KG  300-1200  



SOURCE OF URINE: CLEAN CATCHURINALYSIS [id number] 11:45:00





 Test Item  Value  Reference Range  Comments

 

 UA COLOR (test code=COLU)  RED  YELLOW  

 

 UA APPEARANCE (test code=APPU)  BLOODY  CLEAR  

 

 UA GLUCOSE DIPSTICK (test code=DGLUU)  NORMAL MG/DL  NORMAL  

 

 UA BILIRUBIN DIPSTICK (test code=BILU)  NEGATIVE MG/DL  NEGATIVE  

 

 UA KETONE DIPSTICK (test code=KETU)  NEGATIVE MG/DL  NEGATIVE  

 

 UA SPECIFIC GRAVITY (test code=SGU)  1.020  1.003-1.030  

 

 UA BLOOD DIPSTICK (test code=MELVIN)  250 Andre/mm3  NEGATIVE  

 

 UA PH DIPSTICK (test code=VALARIE)  5.0  5.0-9.0  

 

 UA PROTEIN DIPSTICK (test code=PROU)  100 MG/DL  NEGATIVE  

 

 UA UROBILINIOGEN DIPSTICK (test code=URO)  NORMAL MG/DL  NORMAL  

 

 UA NITRITE DIPSTICK (test code=ANA ROSA)  NEGATIVE  NEGATIVE  

 

 UA LEUKOCYTE ESTERASE DIPSTICK (test  NEGATIVE /mm3  NEGATIVE  



 code=LEUU)      

 

 UA CULTURE NEEDED? (test code=UACULT)   Criteria  Culture Chk  



SOURCE OF URINE: CLEAN CATCHUA [id number] 11:45:00





 Test Item  Value  Reference Range  Comments

 

 UA RBC (test code=RBCU)   RBC/HPF  0-3  

 

 UA WBC (test code=XWBCU)   WBC/HPF  0-5  

 

 UA EPITHELIAL CELLS (test code=EPIU)   EPI/HPF  FEW  

 

 UA BACTERIA (test code=XBACU)    NONE  



SOURCE OF URINE: CLEAN CATCHUR SODIUM [id number] 11:45:00





 Test Item  Value  Reference Range  Comments

 

 UR SODIUM RANDOM (test code=TERA)   MMOL/L    



SOURCE OF URINE: CLEAN CATCHUR CHLORIDE [id number] 11:45:00





 Test Item  Value  Reference Range  Comments

 

 UR CHLORIDE RANDOM (test code=CLU)   MMOL/L    



SOURCE OF URINE: CLEAN CATCHUR OSMOLALITY [id number] 11:45:00





 Test Item  Value  Reference Range  Comments

 

 UR OSMOLALITY RANDOM (test code=OSMOU)   MOS/KG  300-1200  



SOURCE OF URINE: CLEAN CATCHGLUCOSE BEDSIDE [id number] 11:30:00





 Test Item  Value  Reference Range  Comments

 

 GLUCOSE BEDSIDE TESTING (test code=GLUBED)  154 MG/DL  60-99  



- XR CHEST [id number] 09:18:00  Patient Name: MARCUS STERLING              
  Unit No: [id number]          EXAMS:                              CPT CODE:   
    [id number] XR CHEST 1V                                02316                 
   EXAM: Portable chest x-ray               Dictation location: T18 COMPARISON: 
Chest x-ray on 2019               INDICATION: CHF               DISCUSSION
:Mild rightward rotation is noted. Bibasilar consolidation is noted.       
There is a moderate to large right and small to moderate-sized left       
pleural effusion. There is mild cardiac silhouette enlargement. No       acute 
bony abnormalities are seen.                 IMPRESSION:         1. Bibasilar 
consolidation, either due to atelectasis, pneumonia, or         combination of 
these.         2. Moderate to large right and small to moderate-sized left 
pleural         effusions.         3. Unchanged mild cardiac silhouette 
enlargement.          ** Electronically Signed by Rick Mace MD on 2019 at 0918 **                      Reported and signed by: Rick Mace MD  
  CC: Gustavo Sosa; Sandoval Francois; Berny No MD                        
                     Technologist: La Nguyen (RT)(R)              
Transcrpt Date/Tm/Trnsp: 2019 (0918) [email or web address]                 Orig Print 
D/T: S: 2019 (1686)                              [facility]               
            NAME: MARCUS STERLING                [id number]              
        PHYS: [place] [address]                
    : 1935 AGE: 84      SEX: F     ACCT NO: [id number] LOC: [id number] 
     PHONE #: [phone]               EXAM DATE: 2019 STATUS: ADM IN  
   FAX #: [phone]               RADIOLOGY NO:            PAGE  1         
Signed ReportB-TYPE NATRIURETIC [id number] 08:31:00





 Test Item  Value  Reference Range  Comments

 

 B-TYPE NATRIURETIC PEPTIDE (test code=BNP)  2118.0 PG/ML  0-100  



GLUCOSE BEDSIDE [id number] 08:03:00





 Test Item  Value  Reference Range  Comments

 

 GLUCOSE BEDSIDE TESTING (test code=GLUBED)  147 MG/DL  60-99  



BASIC METABOLIC [id number] 06:14:00





 Test Item  Value  Reference Range  Comments

 

 SODIUM (test code=NA)  125 MMOL/L  137-145  

 

 POTASSIUM (test code=K)  4.2 MMOL/L  3.5-5.1  

 

 CHLORIDE (test code=CL)  87 MMOL/L    

 

 CARBON DIOXIDE (test code=CO2)  29 MMOL/L  22-30  

 

 ANION GAP (test code=GAP)  13 MMOL/L  14-24  

 

 GLUCOSE (test code=GLU)  152 MG/DL    

 

 BLOOD UREA NITROGEN (test  59 MG/DL  7-17  



 code=BUN)      

 

 GLOMERULAR FILTRATION RATE  22    Reporting units: ml/min/1.73



 (test code=GFR)      m2  (Modified MDRD



       Formula)Reference Range: >



       or=60 ml/min/1.73 m2

 

 CREATININE (test code=CREAT)  2.10 MG/DL  0.52-1.04  

 

 CALCIUM (test code=CA)  7.7 MG/DL  8.4-10.2  



BASIC METABOLIC [id number] 06:00:00





 Test Item  Value  Reference Range  Comments

 

 SODIUM (test code=NA)  125 MMOL/L  137-145  

 

 POTASSIUM (test code=K)  4.2 MMOL/L  3.5-5.1  

 

 CHLORIDE (test code=CL)  87 MMOL/L    

 

 CARBON DIOXIDE (test code=CO2)   MMOL/L  22-30  

 

 GLUCOSE (test code=GLU)   MG/DL    

 

 BLOOD UREA NITROGEN (test code=BUN)   MG/DL  7-17  

 

 GLOMERULAR FILTRATION RATE (test code=GFR)      

 

 CREATININE (test code=CREAT)   MG/DL  0.52-1.04  

 

 CALCIUM (test code=CA)   MG/DL  8.7-9.7  



BASIC METABOLIC [id number] 21:03:00





 Test Item  Value  Reference Range  Comments

 

 SODIUM (test code=NA)  126 MMOL/L  137-145  

 

 POTASSIUM (test code=K)  4.4 MMOL/L  3.5-5.1  

 

 CHLORIDE (test code=CL)  85 MMOL/L    

 

 CARBON DIOXIDE (test code=CO2)  30 MMOL/L  22-30  

 

 ANION GAP (test code=GAP)  15 MMOL/L  14-24  

 

 GLUCOSE (test code=GLU)  197 MG/DL    

 

 BLOOD UREA NITROGEN (test  57 MG/DL  7-17  



 code=BUN)      

 

 GLOMERULAR FILTRATION RATE  20    Reporting units: ml/min/1.73



 (test code=GFR)      m2  (Modified MDRD



       Formula)Reference Range: >



       or=60 ml/min/1.73 m2

 

 CREATININE (test code=CREAT)  2.30 MG/DL  0.52-1.04  

 

 CALCIUM (test code=CA)  7.8 MG/DL  8.4-10.2  



BASIC METABOLIC [id number] 20:59:00





 Test Item  Value  Reference Range  Comments

 

 SODIUM (test code=NA)  126 MMOL/L  137-145  

 

 POTASSIUM (test code=K)  4.4 MMOL/L  3.5-5.1  

 

 CHLORIDE (test code=CL)  85 MMOL/L    

 

 CARBON DIOXIDE (test code=CO2)   MMOL/L  22-30  

 

 GLUCOSE (test code=GLU)   MG/DL    

 

 BLOOD UREA NITROGEN (test code=BUN)   MG/DL  7-17  

 

 GLOMERULAR FILTRATION RATE (test code=GFR)      

 

 CREATININE (test code=CREAT)   MG/DL  0.52-1.04  

 

 CALCIUM (test code=CA)   MG/DL  8.7-9.7  



GLUCOSE BEDSIDE [id number] 19:51:00





 Test Item  Value  Reference Range  Comments

 

 GLUCOSE BEDSIDE TESTING (test code=GLUBED)  174 MG/DL  60-99  



- US RETROPERITONEAL [id number] 19:24:00  Patient Name: MARCUS STERLING   
             Unit No: [id number]          EXAMS:                              
CPT CODE:       [id number] US RETROPERITONEAL COM                     20213     
               Location code: B2               Renal Sonogram               
Indication: r/o obstruction.               Comparison: None.               
Technical factors: Long and short axis gray scale images were obtained       
with Doppler and color flow evaluation.               Findings:           Right 
kidney: The kidney measures 8.8 x 3.2 x 4.2 cm. No       hydronephrosis or 
perirenal fluid collection. Renal cortical thickness       1.1 cm is normal. 
Parenchymal echogenicity is normal. No evidence of       renal cyst or mass.   
             Left kidney: The kidney measures 8.9 x 3.9 x 3.5 cm.No 
hydronephrosis       or perirenal fluid collection. Renal cortical thickness of 
1.1 cmis       normal. Parenchymal echogenicity is normal.  A thin-walled 
homogeneous       midpole cyst measures 1.3 cm in diameter and does not require
       follow-up.               Urinary bladder: Noyola catheter in place.      
           Impression:                    1. Normal exam.  No evidence of 
obstruction                                                                    
           ** Electronically Signed by OVIDIO Vera **           **   
           on 2019 at 1924             **                    Reported and 
signed by: Preston Vera M.D.           CC: Gustavo Sosa; Berny No MD
                 Technologist: Tanvi Campbell RDMS(OB)(AB)                       
        Transcrpt Date/Tm/Trnsp: 2019 () [email or web address]                
Orig Print D/T: S: 2019 ()                    [facility]              
             NAME: MARCUS STERLING                [id number]              
        PHYS: [place] [address]    : 1935 AGE: 84      SEX: F                                       ACCT NO: 
[id number] LOC: [id number]      PHONE #: [phone]               EXAM DATE:  STATUS: ADM IN     FAX #:[phone]               RADIOLOGY NO:     
       PAGE  1                       Signed ReportGLUCOSE BEDSIDE [id number]
[id number] 16:38:00





 Test Item  Value  Reference Range  Comments

 

 GLUCOSE BEDSIDE TESTING (test code=GLUBED)  175 MG/DL  60-99  



GLUCOSE BEDSIDE [id number] 07:34:00





 Test Item  Value  Reference Range  Comments

 

 GLUCOSE BEDSIDE TESTING (test code=GLUBED)  166 MG/DL  60-99  



GLUCOSE BEDSIDE [id number] 16:10:00





 Test Item  Value  Reference Range  Comments

 

 GLUCOSE BEDSIDE TESTING (test code=GLUBED)  156 MG/DL  60-99  



GLUCOSE BEDSIDE [id number] 12:15:00





 Test Item  Value  Reference Range  Comments

 

 GLUCOSE BEDSIDE TESTING (test code=GLUBED)  200 MG/DL  60-99  



GLUCOSE BEDSIDE [id number] 07:55:00





 Test Item  Value  Reference Range  Comments

 

 GLUCOSE BEDSIDE TESTING (test code=GLUBED)  140 MG/DL  60-99  



BASIC METABOLIC [id number] 07:06:00





 Test Item  Value  Reference Range  Comments

 

 SODIUM (test code=NA)  132 MMOL/L  137-145  

 

 POTASSIUM (test code=K)  3.8 MMOL/L  3.5-5.1  

 

 CHLORIDE (test code=CL)  89 MMOL/L    

 

 CARBON DIOXIDE (test code=CO2)  33 MMOL/L  22-30  

 

 GLUCOSE (test code=GLU)  139 MG/DL    

 

 BLOOD UREA NITROGEN (test  52 MG/DL  7-17  



 code=BUN)      

 

 GLOMERULAR FILTRATION RATE  27    Reporting units: ml/min/1.73



 (test code=GFR)      m2  (Modified MDRD



       Formula)Reference Range: >



       or=60 ml/min/1.73 m2

 

 CREATININE (test code=CREAT)  1.80 MG/DL  0.52-1.04  

 

 CALCIUM (test code=CA)  7.7 MG/DL  8.4-10.2  



BASIC METABOLIC [id number] 07:05:00





 Test Item  Value  Reference Range  Comments

 

 SODIUM (test code=NA)  132 MMOL/L  137-145  

 

 POTASSIUM (test code=K)  3.8 MMOL/L  3.5-5.1  

 

 CHLORIDE (test code=CL)  89 MMOL/L    

 

 CARBON DIOXIDE (test code=CO2)   MMOL/L  22-30  

 

 GLUCOSE (test code=GLU)   MG/DL    

 

 BLOOD UREA NITROGEN (test   MG/DL  7-17  



 code=BUN)      

 

 GLOMERULAR FILTRATION RATE  27    Reporting units: ml/min/1.73



 (test code=GFR)      m2  (Modified MDRD



       Formula)Reference Range: >



       or=60 ml/min/1.73 m2

 

 CREATININE (test code=CREAT)  1.80 MG/DL  0.52-1.04  

 

 CALCIUM (test code=CA)   MG/DL  8.7-9.7  



BASIC METABOLIC [id number] 07:05:00





 Test Item  Value  Reference Range  Comments

 

 SODIUM (test code=NA)  132 MMOL/L  137-145  

 

 POTASSIUM (test code=K)  3.8 MMOL/L  3.5-5.1  

 

 CHLORIDE (test code=CL)  89 MMOL/L    

 

 CARBON DIOXIDE (test code=CO2)  33 MMOL/L  22-30  

 

 GLUCOSE (test code=GLU)   MG/DL    

 

 BLOOD UREA NITROGEN (test  52 MG/DL  7-17  



 code=BUN)      

 

 GLOMERULAR FILTRATION RATE  27    Reporting units: ml/min/1.73



 (test code=GFR)      m2  (Modified MDRD



       Formula)Reference Range: >



       or=60 ml/min/1.73 m2

 

 CREATININE (test code=CREAT)  1.80 MG/DL  0.52-1.04  

 

 CALCIUM (test code=CA)   MG/DL  8.7-9.7  



BASIC METABOLIC [id number] 07:02:00





 Test Item  Value  Reference Range  Comments

 

 SODIUM (test code=NA)  132 MMOL/L  137-145  

 

 POTASSIUM (test code=K)  3.8 MMOL/L  3.5-5.1  

 

 CHLORIDE (test code=CL)  89 MMOL/L    

 

 CARBON DIOXIDE (test code=CO2)   MMOL/L  22-30  

 

 GLUCOSE (test code=GLU)   MG/DL    

 

 BLOOD UREA NITROGEN (test code=BUN)   MG/DL  7-17  

 

 GLOMERULAR FILTRATION RATE (test code=GFR)      

 

 CREATININE (test code=CREAT)   MG/DL  0.52-1.04  

 

 CALCIUM (test code=CA)   MG/DL  8.7-9.7  



GLUCOSE BEDSIDE [id number] 20:56:00





 Test Item  Value  Reference Range  Comments

 

 GLUCOSE BEDSIDE TESTING (test code=GLUBED)  155 MG/DL  60-99  



GLUCOSE BEDSIDE [id number] 16:03:00





 Test Item  Value  Reference Range  Comments

 

 GLUCOSE BEDSIDE TESTING (test code=GLUBED)  151 MG/DL  60-99  



GLUCOSE BEDSIDE [id number] 12:06:00





 Test Item  Value  Reference Range  Comments

 

 GLUCOSE BEDSIDE TESTING (test code=GLUBED)  152 MG/DL  60-99  



GLUCOSE BEDSIDE [id number] 07:45:00





 Test Item  Value  Reference Range  Comments

 

 GLUCOSE BEDSIDE TESTING (test code=GLUBED)  149 MG/DL  60-99  



BASIC METABOLIC [id number] 06:27:00





 Test Item  Value  Reference Range  Comments

 

 SODIUM (test code=NA)  132 MMOL/L  137-145  

 

 POTASSIUM (test code=K)  3.7 MMOL/L  3.5-5.1  

 

 CHLORIDE (test code=CL)  89 MMOL/L    

 

 CARBON DIOXIDE (test code=CO2)  33 MMOL/L  22-30  

 

 GLUCOSE (test code=GLU)  145 MG/DL    

 

 BLOOD UREA NITROGEN (test  50 MG/DL  717  



 code=BUN)      

 

 GLOMERULAR FILTRATION RATE  24    Reporting units: ml/min/1.73



 (test code=GFR)      m2  (Modified MDRD



       Formula)Reference Range: >



       or=60 ml/min/1.73 m2

 

 CREATININE (test code=CREAT)  2.00 MG/DL  0.52-1.04  

 

 CALCIUM (test code=CA)  7.6 MG/DL  8.4-10.2  



BASIC METABOLIC [id number] 06:26:00





 Test Item  Value  Reference Range  Comments

 

 SODIUM (test code=NA)  132 MMOL/L  137-145  

 

 POTASSIUM (test code=K)  3.7 MMOL/L  3.5-5.1  

 

 CHLORIDE (test code=CL)  89 MMOL/L    

 

 CARBON DIOXIDE (test code=CO2)   MMOL/L  22-30  

 

 GLUCOSE (test code=GLU)   MG/DL    

 

 BLOOD UREA NITROGEN (test   MG/DL  717  



 code=BUN)      

 

 GLOMERULAR FILTRATION RATE  24    Reporting units: ml/min/1.73



 (test code=GFR)      m2  (Modified MDRD



       Formula)Reference Range: >



       or=60 ml/min/1.73 m2

 

 CREATININE (test code=CREAT)  2.00 MG/DL  0.52-1.04  

 

 CALCIUM (test code=CA)   MG/DL  8.7-9.7  



BASIC METABOLIC [id number] 06:24:00





 Test Item  Value  Reference Range  Comments

 

 SODIUM (test code=NA)  132 MMOL/L  137-145  

 

 POTASSIUM (test code=K)  3.7 MMOL/L  3.5-5.1  

 

 CHLORIDE (test code=CL)  89 MMOL/L    

 

 CARBON DIOXIDE (test code=CO2)   MMOL/L  22-30  

 

 GLUCOSE (test code=GLU)   MG/DL    

 

 BLOOD UREA NITROGEN (test code=BUN)   MG/DL  7-17  

 

 GLOMERULAR FILTRATION RATE (test code=GFR)      

 

 CREATININE (test code=CREAT)   MG/DL  0.52-1.04  

 

 CALCIUM (test code=CA)   MG/DL  8.7-9.7  



BASIC METABOLIC [id number] 06:23:00





 Test Item  Value  Reference Range  Comments

 

 SODIUM (test code=NA)  132 MMOL/L  137-145  

 

 POTASSIUM (test code=K)   MMOL/L  3.5-5.1  

 

 CHLORIDE (test code=CL)  89 MMOL/L    

 

 CARBON DIOXIDE (test code=CO2)   MMOL/L  22-30  

 

 GLUCOSE (test code=GLU)   MG/DL    

 

 BLOOD UREA NITROGEN (test code=BUN)   MG/DL  7-17  

 

 GLOMERULAR FILTRATION RATE (test code=GFR)      

 

 CREATININE (test code=CREAT)   MG/DL  0.52-1.04  

 

 CALCIUM (test code=CA)   MG/DL  8.7-9.7  



GLUCOSE BEDSIDE [id number] 20:01:00





 Test Item  Value  Reference Range  Comments

 

 GLUCOSE BEDSIDE TESTING (test code=GLUBED)  165 MG/DL  60-99  



GLUCOSE BEDSIDE [id number] 16:26:00





 Test Item  Value  Reference Range  Comments

 

 GLUCOSE BEDSIDE TESTING (test code=GLUBED)  155 MG/DL  60-99  



GLUCOSE BEDSIDE [id number] 08:10:00





 Test Item  Value  Reference Range  Comments

 

 GLUCOSE BEDSIDE TESTING (test code=GLUBED)  157 MG/DL  60-99  



BASIC METABOLIC [id number] 06:59:00





 Test Item  Value  Reference Range  Comments

 

 SODIUM (test code=NA)  132 MMOL/L  137-145  

 

 POTASSIUM (test code=K)  3.6 MMOL/L  3.5-5.1  

 

 CHLORIDE (test code=CL)  91 MMOL/L    

 

 CARBON DIOXIDE (test code=CO2)  30 MMOL/L  22-30  

 

 GLUCOSE (test code=GLU)  134 MG/DL    

 

 BLOOD UREA NITROGEN (test  52 MG/DL  7-17  



 code=BUN)      

 

 GLOMERULAR FILTRATION RATE  21    Reporting units: ml/min/1.73



 (test code=GFR)      m2  (Modified MDRD



       Formula)Reference Range: >



       or=60 ml/min/1.73 m2

 

 CREATININE (test code=CREAT)  2.20 MG/DL  0.52-1.04  

 

 CALCIUM (test code=CA)  7.3 MG/DL  8.4-10.2  



BASIC METABOLIC [id number] 06:58:00





 Test Item  Value  Reference Range  Comments

 

 SODIUM (test code=NA)  132 MMOL/L  137-145  

 

 POTASSIUM (test code=K)  3.6 MMOL/L  3.5-5.1  

 

 CHLORIDE (test code=CL)  91 MMOL/L    

 

 CARBON DIOXIDE (test code=CO2)  30 MMOL/L  22-30  

 

 GLUCOSE (test code=GLU)  134 MG/DL    

 

 BLOOD UREA NITROGEN (test  52 MG/DL  7-17  



 code=BUN)      

 

 GLOMERULAR FILTRATION RATE  21    Reporting units: ml/min/1.73



 (test code=GFR)      m2  (Modified MDRD



       Formula)Reference Range: >



       or=60 ml/min/1.73 m2

 

 CREATININE (test code=CREAT)  2.20 MG/DL  0.52-1.04  

 

 CALCIUM (test code=CA)   MG/DL  8.7-9.7  



GLUCOSE BEDSIDE [id number] 20:22:00





 Test Item  Value  Reference Range  Comments

 

 GLUCOSE BEDSIDE TESTING (test code=GLUBED)  127 MG/DL  60-99  



GLUCOSE BEDSIDE [id number] 17:37:00





 Test Item  Value  Reference Range  Comments

 

 GLUCOSE BEDSIDE TESTING (test code=GLUBED)  123 MG/DL  60-99  



GLUCOSE BEDSIDE [id number] 12:16:00





 Test Item  Value  Reference Range  Comments

 

 GLUCOSE BEDSIDE TESTING (test code=GLUBED)  162 MG/DL  60-99  



CBC W/AUTO [id number] 11:31:00





 Test Item  Value  Reference Range  Comments

 

 WHITE BLOOD CELL (test code=WBC)  8.9 K/MM3  3.8-9.8  

 

 RED BLOOD CELL (test code=RBC)  3.91 M/MM3  3.58-4.97  

 

 HEMOGLOBIN (test code=HGB)  10.4 G/DL  11.2-14.9  

 

 HEMATOCRIT (test code=HCT)  33.3 %  33.2-43.5  

 

 MEAN CELL VOLUME (test code=MCV)  85 fL  80.7-99.1  

 

 MEAN CELL HGB (test code=MCH)  26.6 pg  27.0-34.1  

 

 MEAN CELL HGB CONCETRATION (test code=MCHC)  31.2 %  32.2-35.7  

 

 RED CELL DISTRIBUTION WIDTH (test code=RDW)  17.1 %  12.1-15.2  

 

 PLATELET COUNT (test code=PLT)  287 K/MM3  129-368  

 

 MEAN PLATELET VOLUME (test code=MPV)  9.7 fl  7.4-10.4  

 

 NEUTROPHIL % (test code=NT%)  80.7 %  43-75  

 

 IMMATURE GRANULOCYTE % (test code=IG%)  0.6 %  0.0-2.0  

 

 LYMPHOCYTE % (test code=LY%)  10.7 %  14-44  

 

 MONOCYTE % (test code=MO%)  7.9 %  4-13  

 

 EOSINOPHIL % (test code=EO%)  0.1 %  0-6  

 

 BASOPHIL % (test code=BA%)  0 %  0-2  

 

 NUCLEATED RBC % (test code=NRBC%)  1.4 %  0-1.0  

 

 NEUTROPHIL # (test code=NT#)  7.16 K/mm3  2.0-7.6  

 

 IMMATURE GRANULOCYTE # (test code=IG#)  0.05 x10 3/uL  0-0.03  

 

 LYMPHOCYTE # (test code=LY#)  0.95 K/mm3  1.0-3.8  

 

 MONOCYTE # (test code=MO#)  0.70 K/mm3  0.1-0.8  

 

 EOSINOPHIL # (test code=EO#)  0.01 K/mm3  0.0-0.2  

 

 BASOPHIL # (test code=BA#)  0 K/mm3  0.0-0.2  

 

 NUCLEATED RBC # (test code=NRBC#)  0.12 K/mm3  0.0-0.1  



DIFFERENTIAL [id number] 11:31:00





 Test Item  Value  Reference Range  Comments

 

 RBC MORPHOLOGY REQUIRED (test code=RBCM)  ABNORMAL    

 

 POIKILOCYTOSIS (test code=POIK)  MANY  NONE  

 

 ANISOCYTOSIS (test code=ANISO)  SLIGHT  NONE  

 

 MACROCYTOSIS (test code=MACR)  FEW  NONE  

 

 CRENATED CELLS (test code=CREN)  MODERATE  NONE  

 

 PLATELET ESTIMATE (test code=PLTEST)  ADEQUATE  ADEQUATE  

 

 PLATELET MORPHOLOGY (test code=PLTMORPH)  NORMAL  NORMAL  



GLUCOSE BEDSIDE [id number] 08:29:00





 Test Item  Value  Reference Range  Comments

 

 GLUCOSE BEDSIDE TESTING (test code=GLUBED)  145 MG/DL  60-99  



BASIC METABOLIC [id number] 07:23:00





 Test Item  Value  Reference Range  Comments

 

 SODIUM (test code=NA)  135 MMOL/L  137-145  

 

 POTASSIUM (test code=K)  3.9 MMOL/L  3.5-5.1  

 

 CHLORIDE (test code=CL)  101 MMOL/L    

 

 CARBON DIOXIDE (test code=CO2)  17 MMOL/L  22-30  

 

 GLUCOSE (test code=GLU)  132 MG/DL    

 

 BLOOD UREA NITROGEN (test  47 MG/DL  7-17  



 code=BUN)      

 

 GLOMERULAR FILTRATION RATE  20    Reporting units: ml/min/1.73



 (test code=GFR)      m2  (Modified MDRD



       Formula)Reference Range: >



       or=60 ml/min/1.73 m2

 

 CREATININE (test code=CREAT)  2.30 MG/DL  0.52-1.04  

 

 CALCIUM (test code=CA)  7.6 MG/DL  8.4-10.2  



BASIC METABOLIC [id number] 06:57:00





 Test Item  Value  Reference Range  Comments

 

 SODIUM (test code=NA)  135 MMOL/L  137-145  

 

 POTASSIUM (test code=K)  3.9 MMOL/L  3.5-5.1  

 

 CHLORIDE (test code=CL)  101 MMOL/L    

 

 CARBON DIOXIDE (test code=CO2)  17 MMOL/L  22-30  

 

 GLUCOSE (test code=GLU)   MG/DL    

 

 BLOOD UREA NITROGEN (test   MG/DL  7-17  



 code=BUN)      

 

 GLOMERULAR FILTRATION RATE  20    Reporting units: ml/min/1.73



 (test code=GFR)      m2  (Modified MDRD



       Formula)Reference Range: >



       or=60 ml/min/1.73 m2

 

 CREATININE (test code=CREAT)  2.30 MG/DL  0.52-1.04  

 

 CALCIUM (test code=CA)   MG/DL  8.7-9.7  



BASIC METABOLIC [id number] 06:55:00





 Test Item  Value  Reference Range  Comments

 

 SODIUM (test code=NA)  135 MMOL/L  137-145  

 

 POTASSIUM (test code=K)  3.9 MMOL/L  3.5-5.1  

 

 CHLORIDE (test code=CL)  101 MMOL/L    

 

 CARBON DIOXIDE (test code=CO2)   MMOL/L  22-30  

 

 GLUCOSE (test code=GLU)   MG/DL    

 

 BLOOD UREA NITROGEN (test code=BUN)   MG/DL  7-17  

 

 GLOMERULAR FILTRATION RATE (test code=GFR)      

 

 CREATININE (test code=CREAT)   MG/DL  0.52-1.04  

 

 CALCIUM (test code=CA)   MG/DL  8.7-9.7  



BASIC METABOLIC [id number] 06:54:00





 Test Item  Value  Reference Range  Comments

 

 SODIUM (test code=NA)   MMOL/L  137-145  

 

 POTASSIUM (test code=K)   MMOL/L  3.5-5.1  

 

 CHLORIDE (test code=CL)  101 MMOL/L    

 

 CARBON DIOXIDE (test code=CO2)   MMOL/L  22-30  

 

 GLUCOSE (test code=GLU)   MG/DL    

 

 BLOOD UREA NITROGEN (test code=BUN)   MG/DL  7-17  

 

 GLOMERULAR FILTRATION RATE (test code=GFR)      

 

 CREATININE (test code=CREAT)   MG/DL  0.52-1.04  

 

 CALCIUM (test code=CA)   MG/DL  8.7-9.7  



CBC W/AUTO [id number] 06:26:00





 Test Item  Value  Reference Range  Comments

 

 WHITE BLOOD CELL (test code=WBC)  8.9 K/MM3  3.8-9.8  

 

 RED BLOOD CELL (test code=RBC)  3.91 M/MM3  3.58-4.97  

 

 HEMOGLOBIN (test code=HGB)  10.4 G/DL  11.2-14.9  

 

 HEMATOCRIT (test code=HCT)  33.3 %  33.2-43.5  

 

 MEAN CELL VOLUME (test code=MCV)  85 fL  80.7-99.1  

 

 MEAN CELL HGB (test code=MCH)  26.6 pg  27.0-34.1  

 

 MEAN CELL HGB CONCETRATION (test code=MCHC)  31.2 %  32.2-35.7  

 

 RED CELL DISTRIBUTION WIDTH (test code=RDW)  17.1 %  12.1-15.2  

 

 PLATELET COUNT (test code=PLT)  287 K/MM3  129-368  

 

 MEAN PLATELET VOLUME (test code=MPV)  9.7 fl  7.4-10.4  

 

 NEUTROPHIL % (test code=NT%)  80.7 %  43-75  

 

 IMMATURE GRANULOCYTE % (test code=IG%)  0.6 %  0.0-2.0  

 

 LYMPHOCYTE % (test code=LY%)  10.7 %  14-44  

 

 MONOCYTE % (test code=MO%)  7.9 %  4-13  

 

 EOSINOPHIL % (test code=EO%)  0.1 %  0-6  

 

 BASOPHIL % (test code=BA%)  0 %  0-2  

 

 NUCLEATED RBC % (test code=NRBC%)  1.4 %  0-1.0  

 

 NEUTROPHIL # (test code=NT#)  7.16 K/mm3  2.0-7.6  

 

 IMMATURE GRANULOCYTE # (test code=IG#)  0.05 x10 3/uL  0-0.03  

 

 LYMPHOCYTE # (test code=LY#)  0.95 K/mm3  1.0-3.8  

 

 MONOCYTE # (test code=MO#)  0.70 K/mm3  0.1-0.8  

 

 EOSINOPHIL # (test code=EO#)  0.01 K/mm3  0.0-0.2  

 

 BASOPHIL # (test code=BA#)  0 K/mm3  0.0-0.2  

 

 NUCLEATED RBC # (test code=NRBC#)  0.12 K/mm3  0.0-0.1  



DIFFERENTIAL [id number] 06:26:00





 Test Item  Value  Reference Range  Comments

 

 RBC MORPHOLOGY REQUIRED (test code=RBCM)      

 

 PLATELET ESTIMATE (test code=PLTEST)    ADEQUATE  

 

 PLATELET MORPHOLOGY (test code=PLTMORPH)    NORMAL  



CBC W/AUTO [id number] 06:26:00





 Test Item  Value  Reference Range  Comments

 

 WHITE BLOOD CELL (test code=WBC)  8.9 K/MM3  3.8-9.8  

 

 RED BLOOD CELL (test code=RBC)  3.91 M/MM3  3.58-4.97  

 

 HEMOGLOBIN (test code=HGB)  10.4 G/DL  11.2-14.9  

 

 HEMATOCRIT (test code=HCT)  33.3 %  33.2-43.5  

 

 MEAN CELL VOLUME (test code=MCV)  85 fL  80.7-99.1  

 

 MEAN CELL HGB (test code=MCH)  26.6 pg  27.0-34.1  

 

 MEAN CELL HGB CONCETRATION (test code=MCHC)  31.2 %  32.2-35.7  

 

 RED CELL DISTRIBUTION WIDTH (test code=RDW)  17.1 %  12.1-15.2  

 

 PLATELET COUNT (test code=PLT)  287 K/MM3  129-368  

 

 MEAN PLATELET VOLUME (test code=MPV)  9.7 fl  7.4-10.4  

 

 NEUTROPHIL % (test code=NT%)  80.7 %  43-75  

 

 IMMATURE GRANULOCYTE % (test code=IG%)  0.6 %  0.0-2.0  

 

 LYMPHOCYTE % (test code=LY%)  10.7 %  14-44  

 

 MONOCYTE % (test code=MO%)  7.9 %  4-13  

 

 EOSINOPHIL % (test code=EO%)  0.1 %  0-6  

 

 BASOPHIL % (test code=BA%)  0 %  0-2  

 

 NUCLEATED RBC % (test code=NRBC%)  1.4 %  0-1.0  

 

 NEUTROPHIL # (test code=NT#)  7.16 K/mm3  2.0-7.6  

 

 IMMATURE GRANULOCYTE # (test code=IG#)  0.05 x10 3/uL  0-0.03  

 

 LYMPHOCYTE # (test code=LY#)  0.95 K/mm3  1.0-3.8  

 

 MONOCYTE # (test code=MO#)  0.70 K/mm3  0.1-0.8  

 

 EOSINOPHIL # (test code=EO#)  0.01 K/mm3  0.0-0.2  

 

 BASOPHIL # (test code=BA#)  0 K/mm3  0.0-0.2  

 

 NUCLEATED RBC # (test code=NRBC#)  0.12 K/mm3  0.0-0.1  



DIFFERENTIAL [id number] 06:26:00





 Test Item  Value  Reference Range  Comments

 

 RBC MORPHOLOGY REQUIRED (test code=RBCM)      

 

 PLATELET ESTIMATE (test code=PLTEST)    ADEQUATE  

 

 PLATELET MORPHOLOGY (test code=PLTMORPH)    NORMAL  



GLUCOSE BEDSIDE [id number] 17:48:00





 Test Item  Value  Reference Range  Comments

 

 GLUCOSE BEDSIDE TESTING (test code=GLUBED)  130 MG/DL  60-99  



- MRI BRAIN W/O [id number] 15:34:00  Patient Name: MARCUS STERLING   
             Unit No: [id number]          EXAMS:                              
CPT CODE:       [id number] MRI BRAIN W/O CONTRAST                     [id number]     
               EXAM:  - MRI BRAIN W/O CONTRAST               LOCATION: H57     
          HISTORY: 84 years-year old Female with RULE OUT STROKE               
TECHNIQUE: Multiplanar multisequence MR images of the brain were       obtained 
without intravenous contrast.               COMPARISON: Same day CT head       
        FINDINGS:               Restricted diffusion is seen in the right 
posterior parafalcine       frontal lobe.  Right cerebellar encephalomalacia. 
There is no mass,       mass effect or abnormal extra-axial fluid collection.  
               The ventricles are normal in size,shape, and position.          
       There are normal signal voids in the larger intracranial vessels.       
        The paranasal sinuses and mastoid air cells are predominantly clear.   
     The marrow signal pattern is within normal limits.                 
IMPRESSION:                   Acute left CATHERINE territory infarct.  No acute 
intracranial hemorrhage.                    ** Electronically Signed by Feliciano Graham on 2019 at 1534 **                      Reported and signed by: 
Feliciano Graham                   CC: Gustavo Sosa; Giovanny Rivera MD; 
Berny No MD                                                            
Technologist: Nava Vick, RT(R)                             Transcrpt 
Date/Tm/Trnsp: 2019 (1534) t.[email or web address]                Orig Print D/T: S:  ([id number])                              [facility]                         
  NAME: MARCUS STERLING                [id number]                      
PHYS: [place] [address]                    : 
1935 AGE: 84      SEX: F                    ACCT NO: [id number] [facility]      PHONE #: [phone]               EXAMDATE: 2019 STATUS: 
ADM IN     FAX #: [phone]               RADIOLOGY NO:            PAGE1    
                   Signed ReportGLUCOSE BEDSIDE [id number] 12:19:00





 Test Item  Value  Reference Range  Comments

 

 GLUCOSE BEDSIDE TESTING (test code=GLUBED)  146 MG/DL  60-99  



LIPOPROTEIN LDL [id number] 11:15:00





 Test Item  Value  Reference Range  Comments

 

 LIPOPROTEIN LDL DIRECT (test  45 mg/dL  100-129  ============================



 code=LDLDIR)      ============================



       ===Reference Interval:



          mg/dL



       mmol/L----------------------



       ----------------------------



       ---------Optimal



                <100



       <2.6Near/above optimal



          100-129



       2.6-3.3Borderline High



             130-159



       3.4-4.1High



             160-189



       4.1-4.9Very High



              &gt;=190



       >=4.9=========This LDL



       result is a direct



       measurement.=========



[id number] 11:15:00





 Test Item  Value  Reference Range  Comments

 

 TROPONIN-I (test  1.610 NG/ML  0.012-0.033  CALLED TO KIERAN MICHAELS & READBACK



 code=TROPI)      ON 19 AT [facility]



       Lorena Wan



LIPOPROTEIN LDL [id number] 10:39:00





 Test Item  Value  Reference Range  Comments

 

 LIPOPROTEIN LDL DIRECT (test code=LDLDIR)   mg/dL  100-129  



[id number] 10:39:00





 Test Item  Value  Reference Range  Comments

 

 TROPONIN-I (test  1.610 NG/ML  0.012-0.033  CALLED TO KIERAN MICHAELS & READBACK



 code=TROPI)      ON 19 AT [facility]



       Lorena Wan



PROTHROMBIN [id number] 10:39:00





 Test Item  Value  Reference Range  Comments

 

 PROTHROMBIN TIME PATIENT (test  14.0 SECONDS  9.6-11.6  



 code=PTP)      

 

 INTERNATIONAL NORMAL RATIO  1.3  0.8-1.1  The INR is to be used only



 (test code=INR)      for monitoring oral



       anticoagulanttherapy.



       INDICATION



       



       INR



       VALUE---------------------



       --------------------------



       ------------1.



       Prophylaxis, deep venous



       thrombosis,    including



       high risk surgery.



                2.0 - 3.0 2.



       Prophylaxis, deep venous



       thrombosis,    hip



       surgery, treatment for



       deep venous    thrombosis



       or pulmonary prevention of



          systemic embolism in



       patients with    valvular



       heart disease, atrial



       fibrillation,    tissue



       heart valve, or acute



       myocardial    infarction.



       



            2.0 - 3.0 3.



       Mechanical prosthesis



       heart valves,    recurrent



       systemic embolism.



               3.0 - 4.5



- CT HEAD/BRAIN W/O [id number] 10:28:00 Patient Name: MARCUS STERLING    
          Unit No: [id number]         EXAMS:                          CPT CODE:
       [id number] CT HEAD/BRAIN W/O CONT                     [address]73              
     Noncontrast CT of the brain               LOCATION:  B2               
Clinical indication:Code stroke               Comparison: None               
Axial, coronal and sagittal series are provided from the skull base to       
vertex without intravenous contrast.  One or more the following dose       
reduction techniques were used: Automated exposure control, adjustment       of 
mA and/or kV according to patient size, and use of iterative       
reconstruction technique.       737.12 mGy-cm.                       
Encephalomalacia is demonstrated in the left parietal parafalcinecortex which 
likely represents subacute infarct.  There is also large       right chronic 
appearing cerebellar infarct.  There is no evidence of       mass, mass effect, 
hemorrhage, hydrocephalus, extra-axial collection       or calvarial 
abnormality.  Visualized portions of the paranasal       sinuses are well 
aerated.                 Impression:                              1.  Subacute 
infarct in the left anterior cerebral artery territory         for which 
further with MRI is recommended.                     2.  Chronic right 
cerebellar infarct.                             3.  Findings called and 
discussed with Dr. Juvenal Andrade at 10:20 AM         on 2019             
      *******************FOR INTERNAL CODING PURPOSES ONLY****************     
    RESULT CODE: CVR     ** Electronically Signed by Alyson Mayen MD **  
         **              on 2019 [id number]             **                   
   Reported and signed by: Alyson Mayen MD            CC: Gustavo Sosa; Juvenal Andrade MD; Berny No MD                                       
Technologist: GALINA CRAIG RT(R)(CT)(MR);  CTDI:        DLP:         Trnscrpt
: 2019 (1028) t.SDR.KRS6                             [facility]           
NAME: MARCUS STERLING                [address]                      PHYS
: OSEAS.Emy- [place] [address]                    :  AGE: 84      SEX: F                                 ACCT NO: [id number] 
LOC: Z.354 A      PHONE #: [phone]        EXAM DATE: 2019 STATUS: 
ADM IN     FAX #: [phone]               RAD #: D/C DT                PAGE  
1                       Signed Report                                 Patient 
Name: MARCUS STERLING              Unit No: [id number]         EXAMS:        
               CPT CODE:       [id number] CT HEAD/BRAIN W/O CONT                
     [address]            &lt;Continued&gt;  Orig Print D/T: S: 2019 (1031)  
                                    [facility]                           NAME: 
MARCUS STERLING            [address]                      PHYS: [place] [address]                    : 1935 AGE
: 84      SEX: F                                       ACCTNO: [id number] LOC
: Z.354 A      PHONE #: [phone]               EXAM DATE: 2019 STATUS:
ADM IN     FAX #: [phone]               RAD #:             D/C DT         
       PAGE  2               Signed [id number] 10:24:00





 Test Item  Value  Reference Range  Comments

 

 GLUCOSE (test code=GLU)  140 MG/DL    



- XR CHEST [id number] 09:46:00  Patient Name: MARCUS STERLING              
  Unit No: [id number]          EXAMS:                              CPT CODE:   
    [id number] XR CHEST 1V                                [address]                 
   Location code: B2               Chest 1 view               Indication: 
Hypoxia.               Comparison: None               Findings:               
Mild cardiomegaly. Pulmonary vascular congestion. No pleural effusion.       
Mild thoracic spondylosis.                 IMPRESSION:                   1. 
Cardiomegaly.         2. Mild pulmonary vascular congestion.** Electronically 
Signed by OVIDIO Vera **           **              on 2019 at 
0946            **                      Reported and signed by: Preston Vera M.D.          CC: Gustavo Sosa; Sandoval Francois; Berny No MD          
                                         Technologist: Nava Lundy, BSRS, RT(R
)                    Transcrpt Date/Tm/Trnsp: 2019 (0946) tFranchescaSDR.DRB1     
           Orig Print D/T: S: 2019 (0949)                              
[facility]                           NAME: MARCUS STERLING                
[address]                      PHYS: [place] [address]                    : 1935 AGE: 84      SEX: F           ACCT 
NO: [id number] LOC: Z.354 A      PHONE #: [phone]               EXAM DATE
: 2019 STATUS: ADM IN     FAX #: [phone]               RADIOLOGY NO:
            PAGE  1               Signed ReportCBC W/AUTO [id number] 08:33:
00





 Test Item  Value  Reference Range  Comments

 

 WHITE BLOOD CELL (test code=WBC)  7.4 K/MM3  3.8-9.8  

 

 RED BLOOD CELL (test code=RBC)  3.77 M/MM3  3.58-4.97  

 

 HEMOGLOBIN (test code=HGB)  9.9 G/DL  11.2-14.9  

 

 HEMATOCRIT (test code=HCT)  32.6 %  33.2-43.5  

 

 MEAN CELL VOLUME (test code=MCV)  87 fL  80.7-99.1  

 

 MEAN CELL HGB (test code=MCH)  26.3 pg  27.0-34.1  

 

 MEAN CELL HGB CONCETRATION (test code=MCHC)  30.4 %  32.2-35.7  

 

 RED CELL DISTRIBUTION WIDTH (test code=RDW)  17.2 %  12.1-15.2  

 

 PLATELET COUNT (test code=PLT)  321 K/MM3  129-368  

 

 MEAN PLATELET VOLUME (test code=MPV)  9.8 fl  7.4-10.4  

 

 NEUTROPHIL % (test code=NT%)  86.9 %  43-75  

 

 IMMATURE GRANULOCYTE % (test code=IG%)  0.5 %  0.0-2.0  

 

 LYMPHOCYTE % (test code=LY%)  8.4 %  14-44  

 

 MONOCYTE % (test code=MO%)  4.1 %  4-13  

 

 EOSINOPHIL % (test code=EO%)  0.0 %  0-6  

 

 BASOPHIL % (test code=BA%)  0.1 %  0-2  

 

 NUCLEATED RBC % (test code=NRBC%)  3.0 %  0-1.0  

 

 NEUTROPHIL # (test code=NT#)  6.42 K/mm3  2.0-7.6  

 

 IMMATURE GRANULOCYTE # (test code=IG#)  0.04 x10 3/uL  0-0.03  

 

 LYMPHOCYTE # (test code=LY#)  0.62 K/mm3  1.0-3.8  

 

 MONOCYTE # (test code=MO#)  0.30 K/mm3  0.1-0.8  

 

 EOSINOPHIL # (test code=EO#)  0.00 K/mm3  0.0-0.2  

 

 BASOPHIL # (test code=BA#)  0.01 K/mm3  0.0-0.2  

 

 NUCLEATED RBC # (test code=NRBC#)  0.22 K/mm3  0.0-0.1  

 

 NUCLEATED RED BLOOD CELL (test code=NRBC)  3  0-0  



DIFFERENTIAL [id number] 08:33:00





 Test Item  Value  Reference Range  Comments

 

 RBC MORPHOLOGY REQUIRED (test code=RBCM)  ABNORMAL    

 

 POIKILOCYTOSIS (test code=POIK)  MODERATE  NONE  

 

 MARIANO CELLS (test code=MARIANO)  MODERATE  NONE  

 

 OVALOCYTES (test code=OVAL)  FEW  NONE  

 

 PLATELET ESTIMATE (test code=PLTEST)  ADEQUATE  ADEQUATE  

 

 PLATELET MORPHOLOGY (test code=PLTMORPH)  NORMAL  NORMAL  



B-TYPE NATRIURETIC [id number] 08:33:00





 Test Item  Value  Reference Range  Comments

 

 B-TYPE NATRIURETIC PEPTIDE (test code=BNP)  3926.0 PG/ML  0-100  



CBC W/AUTO [id number] 07:30:00





 Test Item  Value  Reference Range  Comments

 

 WHITE BLOOD CELL (test code=WBC)  7.4 K/MM3  3.8-9.8  

 

 RED BLOOD CELL (test code=RBC)  3.77 M/MM3  3.58-4.97  

 

 HEMOGLOBIN (test code=HGB)  9.9 G/DL  11.2-14.9  

 

 HEMATOCRIT (test code=HCT)  32.6 %  33.2-43.5  

 

 MEAN CELL VOLUME (test code=MCV)  87 fL  80.7-99.1  

 

 MEAN CELL HGB (test code=MCH)  26.3 pg  27.0-34.1  

 

 MEAN CELL HGB CONCETRATION (test code=MCHC)  30.4 %  32.2-35.7  

 

 RED CELL DISTRIBUTION WIDTH (test code=RDW)  17.2 %  12.1-15.2  

 

 PLATELET COUNT (test code=PLT)  321 K/MM3  129-368  

 

 MEAN PLATELET VOLUME (test code=MPV)  9.8 fl  7.4-10.4  

 

 NEUTROPHIL % (test code=NT%)  86.9 %  43-75  

 

 IMMATURE GRANULOCYTE % (test code=IG%)  0.5 %  0.0-2.0  

 

 LYMPHOCYTE % (test code=LY%)  8.4 %  14-44  

 

 MONOCYTE % (test code=MO%)  4.1 %  4-13  

 

 EOSINOPHIL % (test code=EO%)  0.0 %  0-6  

 

 BASOPHIL % (test code=BA%)  0.1 %  0-2  

 

 NUCLEATED RBC % (test code=NRBC%)  3.0 %  0-1.0  

 

 NEUTROPHIL # (test code=NT#)  6.42 K/mm3  2.0-7.6  

 

 IMMATURE GRANULOCYTE # (test code=IG#)  0.04 x10 3/uL  0-0.03  

 

 LYMPHOCYTE # (test code=LY#)  0.62 K/mm3  1.0-3.8  

 

 MONOCYTE # (test code=MO#)  0.30 K/mm3  0.1-0.8  

 

 EOSINOPHIL # (test code=EO#)  0.00 K/mm3  0.0-0.2  

 

 BASOPHIL # (test code=BA#)  0.01 K/mm3  0.0-0.2  

 

 NUCLEATED RBC # (test code=NRBC#)  0.22 K/mm3  0.0-0.1  



DIFFERENTIAL [id number] 07:30:00





 Test Item  Value  Reference Range  Comments

 

 RBC MORPHOLOGY REQUIRED (test code=RBCM)      

 

 PLATELET ESTIMATE (test code=PLTEST)    ADEQUATE  

 

 PLATELET MORPHOLOGY (test code=PLTMORPH)    NORMAL  



CBC W/AUTO [id number] 07:30:00





 Test Item  Value  Reference Range  Comments

 

 WHITE BLOOD CELL (test code=WBC)  7.4 K/MM3  3.8-9.8  

 

 RED BLOOD CELL (test code=RBC)  3.77 M/MM3  3.58-4.97  

 

 HEMOGLOBIN (test code=HGB)  9.9 G/DL  11.2-14.9  

 

 HEMATOCRIT (test code=HCT)  32.6 %  33.2-43.5  

 

 MEAN CELL VOLUME (test code=MCV)  87 fL  80.7-99.1  

 

 MEAN CELL HGB (test code=MCH)  26.3 pg  27.0-34.1  

 

 MEAN CELL HGB CONCETRATION (test code=MCHC)  30.4 %  32.2-35.7  

 

 RED CELL DISTRIBUTION WIDTH (test code=RDW)  17.2 %  12.1-15.2  

 

 PLATELET COUNT (test code=PLT)  321 K/MM3  129-368  

 

 MEAN PLATELET VOLUME (test code=MPV)  9.8 fl  7.4-10.4  

 

 NEUTROPHIL % (test code=NT%)  86.9 %  43-75  

 

 IMMATURE GRANULOCYTE % (test code=IG%)  0.5 %  0.0-2.0  

 

 LYMPHOCYTE % (test code=LY%)  8.4 %  14-44  

 

 MONOCYTE % (test code=MO%)  4.1 %  4-13  

 

 EOSINOPHIL % (test code=EO%)  0.0 %  0-6  

 

 BASOPHIL % (test code=BA%)  0.1 %  0-2  

 

 NUCLEATED RBC % (test code=NRBC%)  3.0 %  0-1.0  

 

 NEUTROPHIL # (test code=NT#)  6.42 K/mm3  2.0-7.6  

 

 IMMATURE GRANULOCYTE # (test code=IG#)  0.04 x10 3/uL  0-0.03  

 

 LYMPHOCYTE # (test code=LY#)  0.62 K/mm3  1.0-3.8  

 

 MONOCYTE # (test code=MO#)  0.30 K/mm3  0.1-0.8  

 

 EOSINOPHIL # (test code=EO#)  0.00 K/mm3  0.0-0.2  

 

 BASOPHIL # (test code=BA#)  0.01 K/mm3  0.0-0.2  

 

 NUCLEATED RBC # (test code=NRBC#)  0.22 K/mm3  0.0-0.1  



DIFFERENTIAL [id number] 07:30:00





 Test Item  Value  Reference Range  Comments

 

 RBC MORPHOLOGY REQUIRED (test code=RBCM)      

 

 PLATELET ESTIMATE (test code=PLTEST)    ADEQUATE  

 

 PLATELET MORPHOLOGY (test code=PLTMORPH)    NORMAL  



BASIC METABOLIC [id number] 07:24:00





 Test Item  Value  Reference Range  Comments

 

 SODIUM (test code=NA)  137 MMOL/L  137-145  

 

 POTASSIUM (test code=K)  4.1 MMOL/L  3.5-5.1  

 

 CHLORIDE (test code=CL)  107 MMOL/L    

 

 CARBON DIOXIDE (test code=CO2)  15 MMOL/L  22-30  

 

 GLUCOSE (test code=GLU)  151 MG/DL    

 

 BLOOD UREA NITROGEN (test  38 MG/DL  7-17  



 code=BUN)      

 

 GLOMERULAR FILTRATION RATE  21    Reporting units: ml/min/1.73



 (test code=GFR)      m2  (Modified MDRD



       Formula)Reference Range: >



       or=60 ml/min/1.73 m2

 

 CREATININE (test code=CREAT)  2.20 MG/DL  0.52-1.04  

 

 CALCIUM (test code=CA)  8.0 MG/DL  8.4-10.2  



BASIC METABOLIC [id number] 07:23:00





 Test Item  Value  Reference Range  Comments

 

 SODIUM (test code=NA)  137 MMOL/L  137-145  

 

 POTASSIUM (test code=K)  4.1 MMOL/L  3.5-5.1  

 

 CHLORIDE (test code=CL)  107 MMOL/L    

 

 CARBON DIOXIDE (test code=CO2)   MMOL/L  22-30  

 

 GLUCOSE (test code=GLU)   MG/DL    

 

 BLOOD UREA NITROGEN (test   MG/DL  7-17  



 code=BUN)      

 

 GLOMERULAR FILTRATION RATE  21    Reporting units: ml/min/1.73



 (test code=GFR)      m2  (Modified MDRD



       Formula)Reference Range: >



       or=60 ml/min/1.73 m2

 

 CREATININE (test code=CREAT)  2.20 MG/DL  0.52-1.04  

 

 CALCIUM (test code=CA)   MG/DL  8.7-9.7  



GLYCOSYLATED HEMOGLOBIN [id number] 07:23:00





 Test Item  Value  Reference Range  Comments

 

 GLYCOSYLATED HEMOGLOBIN  6.9 %  4.8-5.9  Any condition that shortens



 (HA1C) (test code=GLYHGB)      erythocyte survival or



       decreasesmean erythrocyte age



       (e.g., recovery from acute blood



       loss,hemolytic anemia) will



       falsely lower HGBA1c



       resultsregardless of the method



       used.  HGBA1c results from



       patientswith HbSS, HbCC, and



       HbSc must be interpreted with



       cautiongiven the pathological



       processes, including



       anemia,increased red cell



       turnover, transfusion



       requirements, thatadversely



       impact HGBA1c as a marker of



       long-term glycemiccontrol.



       Alternative forms of testing



       such as fructosamineshould be



       considered for these patients.

 

 MEAN BLOOD GLUCOSE (test  151 MG/DL    



 code=MBG)      



BASIC METABOLIC [id number] 07:21:00





 Test Item  Value  Reference Range  Comments

 

 SODIUM (test code=NA)  137 MMOL/L  137-145  

 

 POTASSIUM (test code=K)  4.1 MMOL/L  3.5-5.1  

 

 CHLORIDE (test code=CL)  107 MMOL/L    

 

 CARBON DIOXIDE (test code=CO2)   MMOL/L  22-30  

 

 GLUCOSE (test code=GLU)   MG/DL    

 

 BLOOD UREA NITROGEN (test code=BUN)   MG/DL  7-17  

 

 GLOMERULAR FILTRATION RATE (test code=GFR)      

 

 CREATININE (test code=CREAT)   MG/DL  0.52-1.04  

 

 CALCIUM (test code=CA)   MG/DL  8.7-9.7  



UR SMEAR EOSINOPHIL [id number] 21:49:00





 Test Item  Value  Reference Range  Comments

 

 UR SMEAR EOSINOPHIL COUNT (test code=EOSCTU)  NONE  RARE  



UR SODIUM [id number] 19:53:00





 Test Item  Value  Reference Range  Comments

 

 UR SODIUM RANDOM (test code=TERA)  7 MMOL/L    



UR PROTEIN [id number] 19:53:00





 Test Item  Value  Reference Range  Comments

 

 UR PROTEIN RANDOM (test code=PROTU)  12 MG/DL  0-11.9  



UR CREATININE [id number] 19:53:00





 Test Item  Value  Reference Range  Comments

 

 UR CREATININE RANDOM (test code=CREATU)  139.7    



URINALYSIS [id number] 19:52:00





 Test Item  Value  Reference Range  Comments

 

 UA COLOR (test code=COLU)  YELLOW  YELLOW  

 

 UA APPEARANCE (test code=APPU)  SLIGHT CLOUDY  CLEAR  

 

 UA GLUCOSE DIPSTICK (test  250 MG/DL  NORMAL  



 code=DGLUU)      

 

 UA BILIRUBIN DIPSTICK (test  1 MG/DL  NEGATIVE  



 code=BILU)      

 

 UA KETONE DIPSTICK (test  5 MG/DL  NEGATIVE  



 code=KETU)      

 

 UA SPECIFIC GRAVITY (test  1.020  1.003-1.030  



 code=SGU)      

 

 UA BLOOD DIPSTICK (test code=MELVIN)  10 Andre/mm3  NEGATIVE  

 

 UA PH DIPSTICK (test code=VALARIE)  5.0  5.0-9.0  

 

 UA PROTEIN DIPSTICK (test  30 MG/DL  NEGATIVE  



 code=PROU)      

 

 UA UROBILINIOGEN DIPSTICK (test  NORMAL MG/DL  NORMAL  



 code=URO)      

 

 UA NITRITE DIPSTICK (test  POSITIVE  NEGATIVE  



 code=ANA ROSA)      

 

 UA LEUKOCYTE ESTERASE DIPSTICK  100 /mm3  NEGATIVE  



 (test code=LEUU)      

 

 UA CULTURE NEEDED? (test  YES,WBC>10 & EPI<25  Culture Chk  



 code=UACULT)  Criteria    



SOURCE OF URINE: CLEAN CATCHUA ICTOTEST FOR [id number] 19:52:00





 Test Item  Value  Reference Range  Comments

 

 UA ICTOTEST FOR BILIRUBIN (test code=ICTOU)  NEGATIVE  NEGATIVE  



SOURCE OF URINE: CLEAN CATCHUA [id number] 19:52:00





 Test Item  Value  Reference Range  Comments

 

 UA RBC (test code=RBCU)  0-3 RBC/HPF  0-3  

 

 UA WBC (test code=XWBCU)  10-20 WBC/HPF  0-5  

 

 UA EPITHELIAL CELLS (test code=EPIU)  FEW EPI/HPF  FEW  

 

 UA BACTERIA (test code=XBACU)  MODERATE  NONE  

 

 UA MUCUS (test code=MUCU)  SLIGHT #/LPF  NONE  



SOURCE OF URINE: CLEAN CATCHURINALYSIS [id number] 19:46:00





 Test Item  Value  Reference Range  Comments

 

 UA COLOR (test code=COLU)  YELLOW  YELLOW  

 

 UA APPEARANCE (test code=APPU)  SLIGHT CLOUDY  CLEAR  

 

 UA GLUCOSE DIPSTICK (test code=DGLUU)  250 MG/DL  NORMAL  

 

 UA BILIRUBIN DIPSTICK (test code=BILU)  1 MG/DL  NEGATIVE  

 

 UA KETONE DIPSTICK (test code=KETU)  5 MG/DL  NEGATIVE  

 

 UA SPECIFIC GRAVITY (test code=SGU)  1.020  1.003-1.030  

 

 UA BLOOD DIPSTICK (test code=MELVIN)  10 Andre/mm3  NEGATIVE  

 

 UA PH DIPSTICK (test code=VALARIE)  5.0  5.0-9.0  

 

 UA PROTEIN DIPSTICK (test code=PROU)  30 MG/DL  NEGATIVE  

 

 UA UROBILINIOGEN DIPSTICK (test code=URO)  NORMAL MG/DL  NORMAL  

 

 UA NITRITE DIPSTICK (test code=ANA ROSA)  POSITIVE  NEGATIVE  

 

 UA LEUKOCYTE ESTERASE DIPSTICK (test  100 /mm3  NEGATIVE  



 code=LEUU)      

 

 UA CULTURE NEEDED? (test code=UACULT)   Criteria  Culture Chk  



SOURCE OF URINE: CLEAN CATCHUA ICTOTEST FOR [id number] 19:46:00





 Test Item  Value  Reference Range  Comments

 

 UA ICTOTEST FOR BILIRUBIN (test code=ICTOU)    NEGATIVE  



SOURCE OF URINE: CLEAN CATCHUA [id number] 19:46:00





 Test Item  Value  Reference Range  Comments

 

 UA RBC (test code=RBCU)   RBC/HPF  0-3  

 

 UA WBC (test code=XWBCU)   WBC/HPF  0-5  

 

 UA EPITHELIAL CELLS (test code=EPIU)   EPI/HPF  FEW  

 

 UA BACTERIA (test code=XBACU)    NONE  



SOURCE OF URINE: CLEAN CATCHURINALYSIS [id number] 19:46:00





 Test Item  Value  Reference Range  Comments

 

 UA COLOR (test code=COLU)  YELLOW  YELLOW  

 

 UA APPEARANCE (test code=APPU)  SLIGHT CLOUDY  CLEAR  

 

 UA GLUCOSE DIPSTICK (test code=DGLUU)  250 MG/DL  NORMAL  

 

 UA BILIRUBIN DIPSTICK (test code=BILU)  1 MG/DL  NEGATIVE  

 

 UA KETONE DIPSTICK (test code=KETU)  5 MG/DL  NEGATIVE  

 

 UA SPECIFIC GRAVITY (test code=SGU)  1.020  1.003-1.030  

 

 UA BLOOD DIPSTICK (test code=MELVIN)  10 Andre/mm3  NEGATIVE  

 

 UA PH DIPSTICK (test code=VALARIE)  5.0  5.0-9.0  

 

 UA PROTEIN DIPSTICK (test code=PROU)  30 MG/DL  NEGATIVE  

 

 UA UROBILINIOGEN DIPSTICK (test code=URO)  NORMAL MG/DL  NORMAL  

 

 UA NITRITE DIPSTICK (test code=ANA ROSA)  POSITIVE  NEGATIVE  

 

 UA LEUKOCYTE ESTERASE DIPSTICK (test  100 /mm3  NEGATIVE  



 code=LEUU)      

 

 UA CULTURE NEEDED? (test code=UACULT)   Criteria  Culture Chk  



SOURCE OF URINE: CLEAN CATCHUA ICTOTEST FOR [id number] 19:46:00





 Test Item  Value  Reference Range  Comments

 

 UA ICTOTEST FOR BILIRUBIN (test code=ICTOU)    NEGATIVE  



SOURCE OF URINE: CLEAN CATCHUA [id number] 19:46:00





 Test Item  Value  Reference Range  Comments

 

 UA RBC (test code=RBCU)   RBC/HPF  0-3  

 

 UA WBC (test code=XWBCU)   WBC/HPF  0-5  

 

 UA EPITHELIAL CELLS (test code=EPIU)   EPI/HPF  FEW  

 

 UA BACTERIA (test code=XBACU)    NONE  



SOURCE OF URINE: CLEAN CATCHUR SODIUM [id number] 19:45:00





 Test Item  Value  Reference Range  Comments

 

 UR SODIUM RANDOM (test code=TERA)  7 MMOL/L    



UR PROTEIN [id number] 19:45:00





 Test Item  Value  Reference Range  Comments

 

 UR PROTEIN RANDOM (test code=PROTU)  12 MG/DL  0-11.9  



UR CREATININE [id number] 19:45:00





 Test Item  Value  Reference Range  Comments

 

 UR CREATININE RANDOM (test code=CREATU)      



GLUCOSE BEDSIDE [id number] 19:44:00





 Test Item  Value  Reference Range  Comments

 

 GLUCOSE BEDSIDE TESTING (test code=GLUBED)  126 MG/DL  60-99  



UR SODIUM [id number] 19:43:00





 Test Item  Value  Reference Range  Comments

 

 UR SODIUM RANDOM (test code=TERA)  7 MMOL/L    



UR PROTEIN [id number] 19:43:00





 Test Item  Value  Reference Range  Comments

 

 UR PROTEIN RANDOM (test code=PROTU)   MG/DL  0-11.9  



UR CREATININE [id number] 19:43:00





 Test Item  Value  Reference Range  Comments

 

 UR CREATININE RANDOM (test code=CREATU)      



GLUCOSE BEDSIDE [id number] 18:12:00





 Test Item  Value  Reference Range  Comments

 

 GLUCOSE BEDSIDE TESTING (test code=GLUBED)  104 MG/DL  60-99  



ISTAT BLOOD [id number] 11:23:00





 Test Item  Value  Reference Range  Comments

 

 ISTAT-PH ARTERIAL (test code=PHAP)  7.254  7.32-7.50  

 

 ISTAT-PCO2 ARTERIAL (test code=PCO2AP)  35.7 MMHG  27.0-40.0  

 

 ISTAT-PO2 ARTERIAL (test code=PO2AP)  33 MMHG  80.0-100.0  

 

 ISTAT-HCO3 ARTERIAL (test code=HCO3AP)  15.8 MMOL/L  18-23  

 

 ISTAT-BASE EXCESS ARTERIAL (test code=BEAP)  -11 MMOL/L    

 

 ISTAT-SO2 ARTERIAL (test code=SO2AP)  54 %  95-98  

 

 IONIZED CALCIUM (test code=CAIABG)  1.09 MMOL/L  1.12-1.24  

 

 ISTAT-SODIUM (test code=NAP)  140 MMOL/L  137-144  

 

 ISTAT-POTASSIUM (test code=KP)  3.7 MMOL/L  3.1-4.8  

 

 ISTAT-GLUCOSE (test code=GLUP)  70 MG/DL  60-99  



ISTAT BLOOD [id number] 11:23:00





 Test Item  Value  Reference Range  Comments

 

 ISTAT-PH ARTERIAL (test code=PHAP)  7.258  7.32-7.50  

 

 ISTAT-PCO2 ARTERIAL (test code=PCO2AP)  33.2 MMHG  27.0-40.0  

 

 ISTAT-PO2 ARTERIAL (test code=PO2AP)  72 MMHG  80.0-100.0  

 

 ISTAT-HCO3 ARTERIAL (test code=HCO3AP)  14.8 MMOL/L    

 

 ISTAT-BASE EXCESS ARTERIAL (test code=BEAP)  -12 MMOL/L    

 

 ISTAT-SO2 ARTERIAL (test code=SO2AP)  92 %  95-98  

 

 IONIZED CALCIUM (test code=CAIABG)  1.11 MMOL/L  1.12-1.24  

 

 ISTAT-SODIUM (test code=NAP)  140 MMOL/L  137-144  

 

 ISTAT-POTASSIUM (test code=KP)  3.7 MMOL/L  3.1-4.8  

 

 ISTAT-GLUCOSE (test code=GLUP)  71 MG/DL  60-99  



COMPREHENSIVE METABOLIC [id number] 06:57:00





 Test Item  Value  Reference Range  Comments

 

 SODIUM (test code=NA)  139 MMOL/L  137-145  

 

 POTASSIUM (test code=K)  4.0 MMOL/L  3.5-5.1  

 

 CHLORIDE (test code=CL)  108 MMOL/L    

 

 CARBON DIOXIDE (test code=CO2)  13 MMOL/L  22-30  

 

 GLUCOSE (test code=GLU)  67 MG/DL    

 

 BLOOD UREA NITROGEN (test  35 MG/DL  7-17  



 code=BUN)      

 

 GLOMERULAR FILTRATION RATE  22    Reporting units: ml/min/1.73



 (test code=GFR)      m2  (Modified MDRD



       Formula)Reference Range: >



       or=60 ml/min/1.73 m2

 

 CREATININE (test code=CREAT)  2.10 MG/DL  0.52-1.04  

 

 TOTAL PROTEIN (test code=PROT)  6.6 G/DL  6.3-8.2  

 

 ALBUMIN (test code=ALB)  3.3 G/DL  3.5-5.0  

 

 CALCIUM (test code=CA)  8.2 MG/DL  8.4-10.2  

 

 BILIRUBIN TOTAL (test  0.8 MG/DL  0.2-1.3  



 code=BILT)      

 

 SGOT/AST (test code=AST)  45 UNITS/L  14-36  

 

 SGPT/ALT (test code=ALT)  14 UNITS/L  <35  

 

 ALKALINE PHOSPHATASE (test  73 UNITS/L    



 code=ALKP)      



COMPREHENSIVE METABOLIC [id number] 06:56:00





 Test Item  Value  Reference Range  Comments

 

 SODIUM (test code=NA)  139 MMOL/L  137-145  

 

 POTASSIUM (test code=K)  4.0 MMOL/L  3.5-5.1  

 

 CHLORIDE (test code=CL)  108 MMOL/L    

 

 CARBON DIOXIDE (test code=CO2)  13 MMOL/L  22-30  

 

 GLUCOSE (test code=GLU)   MG/DL    

 

 BLOOD UREA NITROGEN (test   MG/DL  7-17  



 code=BUN)      

 

 GLOMERULAR FILTRATION RATE  22    Reporting units: ml/min/1.73



 (test code=GFR)      m2  (Modified MDRD



       Formula)Reference Range: >



       or=60 ml/min/1.73 m2

 

 CREATININE (test code=CREAT)  2.10 MG/DL  0.52-1.04  

 

 TOTAL PROTEIN (test code=PROT)  6.6 G/DL  6.3-8.2  

 

 ALBUMIN (test code=ALB)  3.3 G/DL  3.5-5.0  

 

 CALCIUM (test code=CA)   MG/DL  8.7-9.7  

 

 BILIRUBIN TOTAL (test  0.8 MG/DL  0.2-1.3  



 code=BILT)      

 

 SGOT/AST (test code=AST)  45 UNITS/L  14-36  

 

 SGPT/ALT (test code=ALT)   UNITS/L  <35  

 

 ALKALINE PHOSPHATASE (test   UNITS/L    



 code=ALKP)      



COMPREHENSIVE METABOLIC [id number] 06:54:00





 Test Item  Value  Reference Range  Comments

 

 SODIUM (test code=NA)  139 MMOL/L  137-145  

 

 POTASSIUM (test code=K)  4.0 MMOL/L  3.5-5.1  

 

 CHLORIDE (test code=CL)  108 MMOL/L    

 

 CARBON DIOXIDE (test code=CO2)   MMOL/L  22-30  

 

 GLUCOSE (test code=GLU)   MG/DL    

 

 BLOOD UREA NITROGEN (test code=BUN)   MG/DL  7-17  

 

 GLOMERULAR FILTRATION RATE (test code=GFR)      

 

 CREATININE (test code=CREAT)   MG/DL  0.52-1.04  

 

 TOTAL PROTEIN (test code=PROT)   G/DL  6.3-8.2  

 

 ALBUMIN (test code=ALB)  3.3 G/DL  3.5-5.0  

 

 CALCIUM (test code=CA)   MG/DL  8.7-9.7  

 

 BILIRUBIN TOTAL (test code=BILT)   MG/DL  0.2-1.3  

 

 SGOT/AST (test code=AST)   UNITS/L  15-37  

 

 SGPT/ALT (test code=ALT)   UNITS/L  <35  

 

 ALKALINE PHOSPHATASE (test code=ALKP)   UNITS/L    



COMPREHENSIVE METABOLIC [id number] 06:53:00





 Test Item  Value  Reference Range  Comments

 

 SODIUM (test code=NA)   MMOL/L  137-145  

 

 POTASSIUM (test code=K)   MMOL/L  3.5-5.1  

 

 CHLORIDE (test code=CL)   MMOL/L    

 

 CARBON DIOXIDE (test code=CO2)   MMOL/L  22-30  

 

 GLUCOSE (test code=GLU)   MG/DL    

 

 BLOOD UREA NITROGEN (test code=BUN)   MG/DL  7-17  

 

 GLOMERULAR FILTRATION RATE (test code=GFR)      

 

 CREATININE (test code=CREAT)   MG/DL  0.52-1.04  

 

 TOTAL PROTEIN (test code=PROT)   G/DL  6.3-8.2  

 

 ALBUMIN (test code=ALB)  3.3 G/DL  3.5-5.0  

 

 CALCIUM (test code=CA)   MG/DL  8.7-9.7  

 

 BILIRUBIN TOTAL (test code=BILT)   MG/DL  0.2-1.3  

 

 SGOT/AST (test code=AST)   UNITS/L  15-37  

 

 SGPT/ALT (test code=ALT)   UNITS/L  <35  

 

 ALKALINE PHOSPHATASE (test code=ALKP)   UNITS/L    



CBC W/AUTO [id number] 06:10:00





 Test Item  Value  Reference Range  Comments

 

 WHITE BLOOD CELL (test code=WBC)  9.4 K/MM3  3.8-9.8  

 

 RED BLOOD CELL (test code=RBC)  3.94 M/MM3  3.58-4.97  

 

 HEMOGLOBIN (test code=HGB)  10.4 G/DL  11.2-14.9  

 

 HEMATOCRIT (test code=HCT)  34.2 %  33.2-43.5  

 

 MEAN CELL VOLUME (test code=MCV)  87 fL  80.7-99.1  

 

 MEAN CELL HGB (test code=MCH)  26.4 pg  27.0-34.1  

 

 MEAN CELL HGB CONCETRATION (test code=MCHC)  30.4 %  32.2-35.7  

 

 RED CELL DISTRIBUTION WIDTH (test code=RDW)  17.0 %  12.1-15.2  

 

 PLATELET COUNT (test code=PLT)  339 K/MM3  129-368  

 

 MEAN PLATELET VOLUME (test code=MPV)  9.5 fl  7.4-10.4  

 

 NEUTROPHIL % (test code=NT%)  80.7 %  43-75  

 

 IMMATURE GRANULOCYTE % (test code=IG%)  0.5 %  0.0-2.0  

 

 LYMPHOCYTE % (test code=LY%)  11.3 %  14-44  

 

 MONOCYTE % (test code=MO%)  7.2 %  4-13  

 

 EOSINOPHIL % (test code=EO%)  0.2 %  0-6  

 

 BASOPHIL % (test code=BA%)  0.1 %  0-2  

 

 NUCLEATED RBC % (test code=NRBC%)  0.2 %  0-1.0  

 

 NEUTROPHIL # (test code=NT#)  7.58 K/mm3  2.0-7.6  

 

 IMMATURE GRANULOCYTE # (test code=IG#)  0.05 x10 3/uL  0-0.03  

 

 LYMPHOCYTE # (test code=LY#)  1.06 K/mm3  1.0-3.8  

 

 MONOCYTE # (test code=MO#)  0.68 K/mm3  0.1-0.8  

 

 EOSINOPHIL # (test code=EO#)  0.02 K/mm3  0.0-0.2  

 

 BASOPHIL # (test code=BA#)  0.01 K/mm3  0.0-0.2  

 

 NUCLEATED RBC # (test code=NRBC#)  0.02 K/mm3  0.0-0.1

## 2019-12-09 NOTE — EKG
Test Date:    2019-12-09               Test Time:    13:55:40

Technician:   SBS                                    

                                                     

MEASUREMENT RESULTS:                                       

Intervals:                                           

Rate:         81                                     

SC:                                                  

QRSD:         120                                    

QT:           402                                    

QTc:          466                                    

Axis:                                                

P:                                                   

SC:                                                  

QRS:          -33                                    

T:            7                                      

                                                     

INTERPRETIVE STATEMENTS:                                       

                                                     

Atrial fibrillation with premature ventricular or aberrantly conducted

complexes

Left axis deviation

Low voltage QRS

Cannot rule out Anterior infarct, age undetermined

Abnormal ECG

Compared to ECG 10/08/2015 16:24:56

Ventricular premature complex(es) now present

Left-axis deviation now present

Low QRS voltage now present

Sinus bradycardia no longer present

Myocardial infarct finding still present



Electronically Signed On 12-09-19 16:17:06 CST by Basilio Antunez

## 2019-12-09 NOTE — EDPHYS
Physician Documentation                                                                           

 [facility]                                                                 

Name: Nic Plummer                                                                             

Age: 84 yrs                                                                                       

Sex: Female                                                                                       

: 1935                                                                                   

MRN: [id number]                                                                                   

Arrival Date: 2019                                                                          

Time: 13:35                                                                                       

Account#: [id number]                                                                            

Bed 26                                                                                            

Private MD:                                                                                       

ED Physician Scott Henry                                                                      

HPI:                                                                                              

                                                                                             

15:48 This 84 yrs old  Female presents to ER via EMS with complaints of Chest Pain >  courtney 

      30 y/o.                                                                                     

15:48 The patient or guardian reports chest pain that is located primarily in the substernal  courtney 

      area. Onset: just prior to arrival. The pain radiates to the left arm. Associated signs     

      and symptoms: The patient has no apparent associated signs or symptoms. The chest pain      

      is described as causing indigestion, a pressure. Duration: The patient or guardian          

      reports a single episode, that is now resolved. Modifying factors: The symptoms are         

      alleviated by nothing. the symptoms are aggravated by nothing. Severity of pain: At its     

      worst the pain was moderate in the emergency department the pain is unchanged. The          

      patient has experienced similar episodes in the past, several times.                        

                                                                                                  

Historical:                                                                                       

- Allergies:                                                                                      

13:38 PENICILLINS;                                                                            ss  

- Home Meds:                                                                                      

13:45 amiodarone 200 mg Oral tab 1 tab 2 times per day [Active]; apixaban oral 2.5 mg oral 1  ss  

      tab 2 times per day [Active]; aspirin 81 mg Oral TbEC 1 tab once daily [Active]; Coreg      

      6.25 mg Oral tab 1 tab 2 times per day [Active]; Lipitor 80 mg Oral tab 1 tab once          

      daily [Active]; nitroglycerin 0.4 mg SL subl 1 tab every 5 minutes [Active]; Nystatin       

      Powder [Active]; Tylenol [Active];                                                          

- PMHx:                                                                                           

13:38 CVA; residual R sided weakness; CKD; Atrial Fib; CHF;                                   ss  

- PSHx:                                                                                           

13:38 Heart stents; Knee surgery;                                                             ss  

                                                                                                  

- Immunization history:: Adult Immunizations up to date.                                          

- Social history:: Smoking status: Patient/guardian denies using tobacco.                         

- Ebola Screening: : Patient denies exposure to infectious person Patient denies travel           

  to an Ebola-affected area in the 21 days before illness onset.                                  

- Family history:: not pertinent.                                                                 

                                                                                                  

                                                                                                  

ROS:                                                                                              

15:48 Constitutional: Negative for fever, chills, and weight loss, Eyes: Negative for injury, courtney 

      pain, redness, and discharge, ENT: Negative for injury, pain, and discharge, Neck:          

      Negative for injury, pain, and swelling, Respiratory: Negative for shortness of breath,     

      cough, wheezing, and pleuritic chest pain, Abdomen/GI: Negative for abdominal pain,         

      nausea, vomiting, diarrhea, and constipation, Back: Negative for injury and pain, :       

      Negative for injury, bleeding, discharge, and swelling, MS/Extremity: Negative for          

      injury and deformity, Skin: Negative for injury, rash, and discoloration, Neuro:            

      Negative for headache, weakness, numbness, tingling, and seizure, Psych: Negative for       

      depression, anxiety, suicide ideation, homicidal ideation, and hallucinations,              

      Allergy/Immunology: Negative for hives, rash, and allergies, Endocrine: Negative for        

      neck swelling, polydipsia, polyuria, polyphagia, and marked weight changes,                 

      Hematologic/Lymphatic: Negative for swollen nodes, abnormal bleeding, and unusual           

      bruising.                                                                                   

15:48 Cardiovascular: Positive for chest pain, of the chest.                                      

15:48 MS/extremity: Negative for acute changes.                                                   

15:48 Neuro: Negative for altered mental status.                                                  

                                                                                                  

Exam:                                                                                             

15:52 Constitutional:  This is a well developed, well nourished patient who is awake, alert,  courtney 

      and in no acute distress. Head/Face:  Normocephalic, atraumatic. Eyes:  Pupils equal        

      round and reactive to light, extra-ocular motions intact.  Lids and lashes normal.          

      Conjunctiva and sclera are non-icteric and not injected.  Cornea within normal limits.      

      Periorbital areas with no swelling, redness, or edema. ENT:  Nares patent. No nasal         

      discharge, no septal abnormalities noted.  Tympanic membranes are normal and external       

      auditory canals are clear.  Oropharynx with no redness, swelling, or masses, exudates,      

      or evidence of obstruction, uvula midline.  Mucous membranes moist. Neck:  Trachea          

      midline, no thyromegaly or masses palpated, and no cervical lymphadenopathy.  Supple,       

      full range of motion without nuchal rigidity, or vertebral point tenderness.  No            

      Meningismus. Chest/axilla:  Normal chest wall appearance and motion.  Nontender with no     

      deformity.  No lesions are appreciated. Cardiovascular:  Regular rate and rhythm with a     

      normal S1 and S2.  No gallops, murmurs, or rubs.  Normal PMI, no JVD.  No pulse             

      deficits. Respiratory:  Lungs have equal breath sounds bilaterally, clear to                

      auscultation and percussion.  No rales, rhonchi or wheezes noted.  No increased work of     

      breathing, no retractions or nasal flaring. Abdomen/GI:  Soft, non-tender, with normal      

      bowel sounds.  No distension or tympany.  No guarding or rebound.  No evidence of           

      tenderness throughout. Back:  No spinal tenderness.  No costovertebral tenderness.          

      Full range of motion. Skin:  Warm, dry with normal turgor.  Normal color with no            

      rashes, no lesions, and no evidence of cellulitis. MS/ Extremity:  Pulses equal, no         

      cyanosis.  Neurovascular intact.  Full, normal range of motion. Neuro:  Awake and           

      alert, GCS 15, oriented to person, place, time, and situation.  Cranial nerves II-XII       

      grossly intact.  Motor strength 5/5 in all extremities.  Sensory grossly intact.            

      Cerebellar exam normal.  Normal gait. Psych:  Awake, alert, with orientation to person,     

      place and time.  Behavior, mood, and affect are within normal limits.                       

                                                                                                  

Vital Signs:                                                                                      

13:38 BP 96 / 69 LA (auto/reg); Pulse 74; Resp 25 S; Temp 97(A); Pulse Ox 100% on R/A; Pain   jp3 

      5/10;                                                                                       

15:03  / 72; Pulse 75; Resp 14 S; Pulse Ox 100% on R/A;                                 ca1 

16:49  / 57; Pulse 75; Resp 12 S; Pulse Ox 100% on R/A;                                 ca1 

17:30  / 44; Pulse 76; Resp 11; Pulse Ox 100% on R/A;                                   rv  

18:15  / 58; Pulse 76; Resp 10; Pulse Ox 97% on R/A;                                    rv  

                                                                                                  

MDM:                                                                                              

13:42 Patient medically screened.                                                             [facility] 

15:52 Data reviewed: vital signs, nurses notes, lab test result(s), EKG, radiologic studies,  courtney 

      plain films.                                                                                

                                                                                                  

                                                                                             

13:43 Order name: Basic Metabolic Panel; Complete Time: 15:46                                 [facility] 

                                                                                             

13:43 Order name: CBC with Diff                                                               [facility] 

                                                                                             

13:43 Order name: LFT's; Complete Time: 15:46                                                 [facility] 

                                                                                             

13:43 Order name: Magnesium; Complete Time: 15:46                                             [facility] 

                                                                                             

13:43 Order name: NT PRO-BNP; Complete Time: 15:46                                            [facility] 

                                                                                             

13:43 Order name: PT-INR; Complete Time: 15:46                                                [facility] 

                                                                                             

13:43 Order name: Troponin (emerg Dept Use Only); Complete Time: 15:46                        [facility] 

                                                                                             

13:43 Order name: XRAY Chest (1 view); Complete Time: 15:46                                   [facility] 

                                                                                             

13:43 Order name: Lipase; Complete Time: 15:46                                                [facility] 

                                                                                             

13:43 Order name: Urine Culture                                                               [facility] 

                                                                                             

14:04 Order name: Glucose, Ancillary Testing; Complete Time: 15:46                            [facility]

                                                                                             

14:06 Order name: TSH                                                                         [facility] 

                                                                                             

14:13 Order name: Urine Dipstick--Ancillary (enter results)                                     

                                                                                             

14:25 Order name: CBC Smear Scan                                                              [facility]

                                                                                             

13:43 Order name: EKG; Complete Time: 13:44                                                   [facility] 

                                                                                             

13:43 Order name: Cardiac monitoring; Complete Time: 13:52                                    [facility] 

                                                                                             

13:43 Order name: EKG - Nurse/Tech; Complete Time: 13:57                                      [facility] 

                                                                                             

13:43 Order name: IV Saline Lock; Complete Time: 13:57                                        [facility] 

                                                                                             

13:43 Order name: Labs collected and sent; Complete Time: 13:58                               [facility] 

                                                                                             

13:43 Order name: O2 Per Protocol; Complete Time: 13:58                                       [facility] 

                                                                                             

13:43 Order name: O2 Sat Monitoring; Complete Time: 13:58                                     [facility] 

                                                                                             

13:43 Order name: Urine Dipstick-Ancillary (obtain specimen); Complete Time: 14:12            [facility] 

                                                                                                  

Administered Medications:                                                                         

No medications were administered                                                                  

                                                                                                  

                                                                                                  

Disposition:                                                                                      

19 15:56 Hospitalization ordered by Scout Wood for Inpatient Admission. Preliminary     

  diagnosis are Chest pain, unspecified, Angina pectoris, Anemia, unspecified,                    

  Unspecified kidney failure, Urinary tract infection, site not specified.                        

- Bed requested for Telemetry/MedSurg (Inpatient).                                                

- Status is Inpatient Admission.                                                              rv  

- Condition is Fair.                                                                              

- Problem is new.                                                                                 

- Symptoms have improved.                                                                         

UTI on Admission? Yes                                                                             

                                                                                                  

                                                                                                  

                                                                                                  

Signatures:                                                                                       

Dispatcher [employer]                           EDMS                                                 

Shoshana Maher Corey, MD MD cha Smirch, Shelby, RN RN   ss                                                   

Aureliano Beckett RN                    RN   rv                                                   

                                                                                                  

Corrections: (The following items were deleted from the chart)                                    

18:35 15:56 Hospitalization Ordered by Scout Wood DO for Inpatient Admission. Preliminary  bd  

      diagnosis is Chest pain, unspecified; Angina pectoris; Anemia, unspecified; Unspecified     

      kidney failure; Urinary tract infection, site not specified. Bed requested for              

      Telemetry/MedSurg (Inpatient). Status is Inpatient Admission. Condition is Fair.            

      Problem is new. Symptoms have improved. UTI on Admission? Yes. courtney                          

19:57 18:35 2019 15:56 Hospitalization Ordered by Scout Wood DO for Inpatient        rv  

      Admission. Preliminary diagnosis is Chest pain, unspecified; Angina pectoris; Anemia,       

      unspecified; Unspecified kidney failure; Urinary tract infection, site not specified.       

      Bed requested for Telemetry/MedSurg (Inpatient). Status is Inpatient Admission.             

      Condition is Fair. Problem is new. Symptoms have improved. UTI on Admission? Yes. bd        

                                                                                                  

**************************************************************************************************

## 2019-12-09 NOTE — RAD REPORT
EXAM DESCRIPTION:  Venancio Single View12/9/2019 2:19 pm

 

CLINICAL HISTORY:  Chest pain

 

COMPARISON:  2000

 

FINDINGS:   The lungs appear clear of acute infiltrate. The heart is mildly to moderately enlarged

 

IMPRESSION:   No acute abnormalities displayed

## 2019-12-09 NOTE — P.HP
Certification for Inpatient


Patient admitted to: Observation


With expected LOS: <2 Midnights


Patient will require the following post-hospital care: None


Practitioner: I am a practitioner with admitting privileges, knowledge of 

patient current condition, hospital course, and medical plan of care.


Services: Services provided to patient in accordance with Admission 

requirements found in Title 42 Section 412.3 of the Code of Federal Regulations





Patient History


Date of Service: 12/09/19


Primary Care Provider: Dr. Alva


Reason for admission: Chest


History of Present Illness: 





84-year-old  female with multiple medical problems including atrial 

fibrillation on chronic anti coagulation therapy, history of CVA with residual 

right-sided weakness, CAD, chronic renal disease, CHF.





Patient presented with chest pain. Patient was recently hospitalized at [facility].  She had a heart catheterization done at that time.  3 stents 

were placed.  A day after the procedure she started to the develop right-sided 

weakness.  She was found to have a CVA.  Since then patient has transitioned 

from the hospital to rehab at a facility near here.  Today she start to have 

some chest pain. Mainly to the left side.  She denies any nausea, vomiting.





The ER patient was evaluated.  Initial troponin 0.09.  No significant EKG 

changes noted. Chest x-ray unremarkable.  White count 13.2, sodium 126, 

potassium 4.0.  Creatinine 2.4, GFR of 19. Patient admitted for further 

evaluation and observation.





When I saw the patient in the ER, she appeared stable.  She is without chest 

pain. Patient stable at this time.  Family at bedside.


Allergies





Penicillins Allergy (Verified 10/24/13 17:43)


 Hives/Rash





Home medications list reviewed: Yes


Home Medications: 








Alendronate [Fosamax*] 70 mg PO EVERY 7TH DAY 10/24/13 


Losartan Potassium [Cozaar*] 25 mg PO DAILY 10/24/13 


Metoprolol Tartrate [Lopressor*] 25 mg PO DAILY 10/24/13 


Atorvastatin Calcium [Lipitor*] 80 mg PO DAILY 10/08/15 


Clonidine HCl [Catapres*] 0.2 mg PO TID 10/08/15 


Ranitidine [Zantac*] 150 mg PO BID 10/08/15 


Sitagliptin Phosphate [Januvia*] 100 mg PO DAILY 10/08/15 


Ticagrelor [Brilinta*] 90 mg PO BID 10/08/15 


Tramadol HCl [Ultram] 2 tab PO BID 10/08/15 


glipiZIDE [Glucotrol*] 5 mg PO BID 10/08/15 








- Past Medical/Surgical History


Diabetic: Yes


-: Hypertension


-: CAD with prior stents


-: CVA with right-sided residual weakness


-: Hyperlipidemia


-: Atrial fibrillation on chronic anti coagulation 


-: Chronic kidney disease stage II


-: Cholecystectomy


-: cris eye surgery


-: cris knee sx


-: heart cath with stents


Psychosocial/ Personal History: Patient currently at skilled facility.





- Family History


Family History: Reviewed- Non-Contributory





- Social History


Smoking Status: Never smoker


Alcohol use: No


CD- Drugs: No


Caffeine use: No


Place of Residence: Nursing Home





Review of Systems


General: As per HPI


Eyes: Unremarkable


ENT: Unremarkable


Respiratory: Unremarkable


Cardiovascular: Chest Pain, As per HPI


Gastrointestinal: Unremarkable


Genitourinary: Unremarkable


Musculoskeletal: Pedal edema, As per HPI


Integumentary: Unremarkable


Neurological: As per HPI


Lymphatics: Unremarkable





Physical Examination





- Physical Exam


General: Alert, In no apparent distress, Oriented x3, Cooperative


HEENT: Atraumatic


Neck: Supple


Respiratory: Clear to auscultation bilaterally, Normal air movement


Cardiovascular: Irregular heart rate/rhythm (Atrial fibrillation, rate 

controlled)


Gastrointestinal: Normal bowel sounds, Soft and benign, Non-distended


Integumentary: No warmth, No cyanosis, Tenderness/swelling (Some edema to the 

right upper extremity.  This is chronic.  Edema to the lower extremities noted.)


Neurological: Normal affect, Abnormal strength (Right-sided residual weakness 

from prior CVA)





- Studies


Laboratory Data (last 24 hrs)





12/09/19 13:50: PT 26.6 H, INR 2.33


12/09/19 13:50: WBC 13.2 H, Hgb 10.1 L, Hct 31.0 L, Plt Count 255


12/09/19 13:50: Sodium 126 L, Potassium 4.0, BUN 73 H, Creatinine 2.40 H, 

Glucose 111 H, Magnesium 2.4, Total Bilirubin 0.8, AST 66 H, ALT 28, Alkaline 

Phosphatase 111, Lipase 200








Assessment and Plan





- Plan





Impression:


Chest pain with recent heart catheterization status post stent placement x3 

with CAD


Atrial fibrillation on chronic anti coagulation therapy


History of CVA with right-sided residual weakness


Hyperlipidemia


Chronic kidney disease stage 2


Chronic diastolic CHF





Plan:


Chest pain with recent heart catheterization status post stent placement x3 

with CAD:  Patient will be admitted and observed.  Will continue to monitor 

telemetry and cardiac enzymes.  Will restart nursing home medication including 

amiodarone, Eliquis, aspirin, carvedilol, Lipitor, and nitroglycerin.  Will 

consult cardiology for further evaluation and recommendation.  Advanced 

directives address in detail with son.  Patient is do not resuscitate.  She 

does not want future dialysis.  Anticipate discharge tomorrow if workup 

unremarkable.


Atrial fibrillation on chronic anti coagulation therapy:  Restart amiodarone 

and Eliquis.


History of CVA with right-sided residual weakness:  Continue with medications.  

Patient will return back to the nursing home for skilled therapy.


Hyperlipidemia:  Continue medication


Chronic kidney disease stage 2:  Will monitor closely.


Chronic diastolic CHF:  Will provide Lasix.  Continue 1500 cc per day fluid 

restriction


Discharge Plan: Other (Skilled nursing facility)


Plan to discharge in: 24 Hours





- Advance Directives


Does patient have a Living Will: No


Does patient have a Durable POA for Healthcare: No





- Code Status/Comfort Care


Code Status Assessed: Yes (Patient is do not resuscitate)


Time Spent Managing Pts Care (In Minutes): 55

## 2019-12-10 VITALS — TEMPERATURE: 97.9 F | SYSTOLIC BLOOD PRESSURE: 113 MMHG | DIASTOLIC BLOOD PRESSURE: 52 MMHG

## 2019-12-10 VITALS — OXYGEN SATURATION: 96 %

## 2019-12-10 LAB
BUN BLD-MCNC: 77 MG/DL (ref 7–18)
CKMB CREATINE KINASE MB: 1.7 NG/ML (ref 0.3–3.6)
GLUCOSE SERPLBLD-MCNC: 81 MG/DL (ref 74–106)
HCT VFR BLD CALC: 27.8 % (ref 36–45)
HDLC SERPL-MCNC: 34 MG/DL (ref 40–60)
LDLC SERPL CALC-MCNC: 24 MG/DL (ref ?–130)
LYMPHOCYTES # SPEC AUTO: 0.8 K/UL (ref 0.7–4.9)
MAGNESIUM SERPL-MCNC: 2.4 MG/DL (ref 1.8–2.4)
PMV BLD: 7.8 FL (ref 7.6–11.3)
POTASSIUM SERPL-SCNC: 4.3 MMOL/L (ref 3.5–5.1)
RBC # BLD: 3.42 M/UL (ref 3.86–4.86)
TROPONIN I: 0.08 NG/ML (ref 0–0.04)

## 2019-12-10 RX ADMIN — ACETAMINOPHEN PRN MG: 500 TABLET, FILM COATED ORAL at 09:42

## 2019-12-10 RX ADMIN — APIXABAN SCH MG: 2.5 TABLET, FILM COATED ORAL at 09:46

## 2019-12-10 RX ADMIN — AMIODARONE HYDROCHLORIDE SCH MG: 200 TABLET ORAL at 09:46

## 2019-12-10 NOTE — CON
Date of Consultation:  12/10/2019



Admitted by Dr. Wood on 12/09/2019.  I saw the patient on 12/10/2019.



Reason For Consultation:  Chest pain.



History Of Present Illness:  Ms. Plummer is an 84-year-old  woman.  She is a do not 
resuscitate.  She has a history of CVA, CAD status post stent, has chronic renal disease, chronic atr
ial fibrillation, and chronic systolic congestive heart failure, came in with chest pain, atrial fibr
illation __________.  She is already on amiodarone and Eliquis.  Her creatinine is 2.40.  Denied PND,
 orthopnea, pedal edema, palpitations, or syncope.  Denied any nausea, vomiting, diaphoresis.



Past Medical History:  As stated above.



Allergies:  PENICILLIN.



Review of Systems:

Negative.



Social History:  Negative.



Family History:  Negative.



Medications:  At home include Eliquis, amiodarone, Lipitor, aspirin, and Coreg.



Physical Examination:

GENERAL:  Ms. Plummer was rather somnolent, difficult to obtain history from, but appears to be in n
o acute distress.  She was in atrial fibrillation __________, otherwise stable. 

HEENT:  Negative. 

Neck:  Supple.  No bruit, lymphadenopathy, JVD, or thyromegaly. 

Chest:  Clear to auscultation and percussion. 

Cardiac:  Revealed irregularly irregular rhythm and rate.  No murmurs, gallops, or rubs. 

Abdomen:  Benign. 

Extremities:  Revealed no clubbing, cyanosis.  She had 1+ edema.



Diagnostic Data:  Showed creatinine of 2.4.  Hemoglobin 9.0, white count of 12,000.  Sodium was 127. 
 BNP was 31,204.  Troponin is 0.06.  Chest x-ray is negative.  EKG showed atrial fibrillation, rate o
f 83.



Impression And Plan:  

1.Chronic atrial fibrillation, well-controlled on amiodarone and Eliquis.  I will continue that.

2.Chest pain, atypical, certainly could be related to angina.  The patient is not a candidate for ca
theterization.  She is a do not resuscitate.  She has a creatinine of 2.4.  She is on Eliquis.  We wi
ll continue medical therapy.  Consider adding low-dose beta-blocker or Imdur.  Echocardiogram is pend
ing.

3.Congestive heart failure, most likely acute on chronic diastolic congestive heart failure.  BNP of
 31,000, troponin 0.08, may have to do with her kidney function.  We will see what the echo shows fir
st.

4.History of cerebrovascular accident.

5.Chronic renal disease.

6.Anemia.

7.Hyponatremia.

8.Elevated white count.  I will discuss the case further with Dr. Wood.  For now, continue medical
 therapy.  Check echo.  Increase Coreg to 6.25 b.i.d.  Add Imgiovannar.





NB/BONG

DD:  12/10/2019 10:21:20Voice ID:  [id number]

DT:  12/10/2019 15:21:39Report ID:  [id number]

## 2019-12-10 NOTE — P.DS
Admission Date: 12/09/19


Discharge Date: 12/10/19


Primary Care Provider: Dr. Alva


Disposition: TRANSFER TO NURSING HOME


Discharge Condition: GOOD


Reason for Admission: Chest


Consultations: 





Cardiology-Dr. Martinez





Procedures: 





CXR: 


FINDINGS:   The lungs appear clear of acute infiltrate. The heart is mildly to 

moderately enlarged 


IMPRESSION:   No acute abnormalities displayed





Medical Problem list: 


Chest pain with recent heart catheterization status post stent placement x3 

with CAD


Atrial fibrillation on chronic anti coagulation therapy


History of CVA with right-sided residual weakness


Hyperlipidemia


Chronic kidney disease stage 2 with chronic hyponatremia


Hypertension


Chronic diastolic CHF





Brief History of Present Illness: 





84-year-old  female with multiple medical problems including atrial 

fibrillation on chronic anti coagulation therapy, history of CVA with residual 

right-sided weakness, CAD, chronic renal disease, CHF.





Patient presented with chest pain. Patient was recently hospitalized at [facility].  She had a heart catheterization done at that time.  3 stents 

were placed.  A day after the procedure she started to the develop right-sided 

weakness.  She was found to have a CVA.  Since then patient has transitioned 

from the hospital to rehab at a facility near here.  Today she start to have 

some chest pain. Mainly to the left side.  She denies any nausea, vomiting.





The ER patient was evaluated.  Initial troponin 0.09.  No significant EKG 

changes noted. Chest x-ray unremarkable.  White count 13.2, sodium 126, 

potassium 4.0.  Creatinine 2.4, GFR of 19. Patient admitted for further 

evaluation and observation.





When I saw the patient in the ER, she appeared stable.  She is without chest 

pain. Patient stable at this time.  Family at bedside.


Hospital Course: 





Patient presented with chest pain. Patient has significant history of recent 

heart catheterization about 2-3 weeks ago at [facility].  She 

had 3 stents placed at that time.  After the procedure the following day 

patient had CVA.  Since that time patient with right-sided residual weakness.  

Patient currently at nursing home for skilled therapy.  Chest pain has 

resolved.  Cardiac enzymes improved.  Patient was monitored overnight.  No need 

for further cardiac intervention at this time.  Advanced directives address in 

detail.  Patient is do not resuscitate.  Patient does not want any significant 

intervention if required or dialysis in the future.  Patient will return back 

to nursing home for skilled therapy.  She can transition from skilled therapy 

to home once improved.  Case discussed with patient and family.  Patient will 

continue with her prior medications.





Patient with atrial fibrillation on chronic anti coagulation therapy.  This has 

remained stable.  At discharge she will continue with amiodarone 200 mg twice 

daily and Eliquis 2.5 mg daily.  Recommend follow up with cardiology as 

directed.





Patient with hypertension.  Blood pressure slightly low.  Medications have been 

adjusted.  Patient previously on carvedilol 6.25 mg twice daily.  This will be 

decreased to carvedilol 3.125 mg twice daily.  She is to hold blood pressure 

medication if blood pressure systolic less than 130.  Further adjustment can be 

done by her PCP or cardiology.





Patient with hyperlipidemia.  At discharge she will continue with Lipitor 80 mg 

daily.





As mentioned above patient with history of CVA with right-sided residual 

weakness.  Patient will return back to the nursing home for skilled placement.  

Patient will continue with aspirin 81 mg daily.





Patient with chronic renal disease stage II with chronic hyponatremia.  This 

has remained stable.  Future medications will need to be renally dosed.  

Recommend no further use of nonsteroidal anti-inflammatories.  Patient does not 

desire dialysis in the future.  Recommend follow up with nephrology as 

directed.  Case discussed with nephrology.





Patient with chronic diastolic CHF.  Patient will continue with a 1500 cc per 

day fluid restriction.  Recommend to monitor weight daily.  If her weight 

increases by more than 5 lb patient may require diuretic therapy.  This can be 

further managed by cardiology or nephrology.


Vital Signs/Physical Exam: 














Temp Pulse Resp BP Pulse Ox


 


 97.6 F   87   17   93/46 L  98 


 


 12/10/19 08:00  12/10/19 08:00  12/10/19 08:00  12/10/19 08:00  12/10/19 08:00








General: Alert, In no apparent distress, Oriented x3, Cooperative


HEENT: Atraumatic


Neck: Supple


Respiratory: Clear to auscultation bilaterally, Normal air movement


Cardiovascular: Irregular heart rate/rhythm (Atrial fibrillation rate controlled

)


Gastrointestinal: Normal bowel sounds, No rebound, No guarding


Neurological: Abnormal strength (Right-sided residual weakness)


Laboratory Data at Discharge: 














WBC  12.0 K/uL (4.3-10.9)  H  12/10/19  05:23    


 


Hgb  9.0 g/dL (12.0-15.0)  L  12/10/19  05:23    


 


Hct  27.8 % (36.0-45.0)  L  12/10/19  05:23    


 


Plt Count  236 K/uL (152-406)   12/10/19  05:23    


 


PT  26.6 SECONDS (9.5-12.5)  H  12/09/19  13:50    


 


INR  2.33   12/09/19  13:50    


 


Sodium  127 mmol/L (136-145)  L  12/10/19  05:23    


 


Potassium  4.3 mmol/L (3.5-5.1)   12/10/19  05:23    


 


BUN  77 mg/dL (7-18)  H  12/10/19  05:23    


 


Creatinine  2.05 mg/dL (0.55-1.3)  H  12/10/19  05:23    


 


Glucose  81 mg/dL ()   12/10/19  05:23    


 


Magnesium  2.4 mg/dL (1.8-2.4)   12/10/19  05:23    


 


Total Bilirubin  0.8 mg/dL (0.2-1.0)   12/09/19  13:50    


 


AST  66 U/L (15-37)  H  12/09/19  13:50    


 


ALT  28 U/L (12-78)   12/09/19  13:50    


 


Alkaline Phosphatase  111 U/L ()   12/09/19  13:50    


 


Troponin I  0.08 ng/mL (0.0-0.045)  H  12/10/19  05:23    


 


Triglycerides  120 mg/dL (<150)   12/10/19  05:23    


 


Cholesterol  82 mg/dL (<200)   12/10/19  05:23    


 


HDL Cholesterol  34 mg/dL (40-60)  L  12/10/19  05:23    


 


Cholesterol/HDL Ratio  2.41   12/10/19  05:23    


 


Lipase  200 U/L ()   12/09/19  13:50    








Home Medications: 








Acetaminophen [Tylenol] 650 mg PO Q6HP PRN 12/10/19 


Amiodarone HCl [Cordarone*] 200 mg PO BID 12/10/19 


Apixaban [Eliquis *] 2.5 mg PO BID 12/10/19 


Aspirin Chewable [Aspirin Chewable*] 81 mg PO DAILY 12/10/19 


Atorvastatin Calcium [Lipitor*] 80 mg PO BEDTIME 12/10/19 


Nitroglycerin [Nitrostat*] 0.4 mg SL PRN 12/10/19 


Nystatin Powder [Mycostatin (Powder)*] 1 appl TOP QSHIFT 12/10/19 


Sodium Chloride 1 gm PO DAILY 12/10/19 


carvediloL [Coreg*] 3.125 mg PO BID 6AM 6PM #60 tab 12/10/19 





New Medications: 


carvediloL [Coreg*] 3.125 mg PO BID 6AM 6PM #60 tab


Patient Discharge Instructions: 1.  Patient to return to nursing home/skilled 

facility.  2.  Patient presented with chest pain. Patient has significant 

history of recent heart catheterization about 2-3 weeks ago at [facility].  She had 3 stents placed at that time.  After the procedure the 

following day patient had CVA.  Since that time patient with right-sided 

residual weakness.  Patient currently at nursing home for skilled therapy.  

Chest pain has resolved.  Cardiac enzymes improved.  Patient was monitored 

overnight.  No need for further cardiac intervention at this time.  Advanced 

directives address in detail.  Patient is do not resuscitate.  Patient does not 

want any significant intervention if required or dialysis in the future.  

Patient will return back to nursing home for skilled therapy.  She can 

transition from skilled therapy to home once improved.  Case discussed with 

patient and family.  Patient will continue with her prior medications.  3.  

Patient with atrial fibrillation on chronic anti coagulation therapy.  This has 

remained stable.  At discharge she will continue with amiodarone 200 mg twice 

daily and Eliquis 2.5 mg daily.  Recommend follow up with cardiology as 

directed.  4.  Patient with hypertension.  Blood pressure slightly low.  

Medications have been adjusted.  Patient previously on carvedilol 6.25 mg twice 

daily.  This will be decreased to carvedilol 3.125 mg twice daily.  She is to 

hold blood pressure medication if blood pressure systolic less than 130.  

Further adjustment can be done by her PCP or cardiology.  5.  Patient with 

hyperlipidemia.  At discharge she will continue with Lipitor 80 mg daily.  6.  

As mentioned above patient with history of CVA with right-sided residual 

weakness.  Patient will return back to the nursing home for skilled placement.  

Patient will continue with aspirin 81 mg daily.  7.  Patient with chronic renal 

disease stage II with chronic hyponatremia.  This has remained stable.  Future 

medications will need to be renally dosed.  Recommend no further use of 

nonsteroidal anti-inflammatories.  Patient does not desire dialysis in the 

future.  Recommend follow up with nephrology as directed.  Case discussed with 

nephrology.  8.  Patient with chronic diastolic CHF.  Patient will continue 

with a 1500 cc per day fluid restriction.  Recommend to monitor weight daily.  

If her weight increases by more than 5 lb patient may require diuretic therapy.

  This can be further managed by cardiology or nephrology.


Diet: AHA


Activity: Fall precautions


Time spent managing pt's care (in minutes): 55

## 2019-12-10 NOTE — ECHO
HEIGHT: 5 ft 0 in   WEIGHT: 195 lb 0 oz   DATE OF STUDY: 12/10/2019   REFER DR: Mariusz Martinez MD

2-DIMENSIONAL: YES

     M.MODE: YES

 DOPPLER: YES

COLOR FLOW: YES



                    TDS:  NO

PORTABLE: NO

 DEFINITY:  NO

BUBBLE STUDY: NO





DIAGNOSIS:  CHEST PAIN, CORONARY ARTERY DISEASE, AORTIC VALVE DISORDER



CARDIAC HISTORY:  

CATHERIZATION: NO

SURGERY: NO

PROSTHETIC VALVE: NO

PACEMAKER: NO





MEASUREMENTS (cm)

    DIASTOLIC (NORMALS)                 SYSTOLIC (NORMALS)

IVSd                 1.1 (0.6-1.2)                    LA Diam 3.5 (1.9-4.0)     LVEF       
  35-40%  

LVIDd               5.2 (3.5-5.7)                        LVIDs      4.1 (2.0-3.5)     %FS  
        21%

LVPWd             1.2 (0.6-1.2)

Ao Diam           3.0 (2.0-3.7)



2 DIMENSIONAL ASSESSMENT:

RIGHT ATRIUM:                   NORMAL

LEFT ATRIUM:       NORMAL



RIGHT VENTRICLE:            NORMAL

LEFT VENTRICLE: NORMAL SIZE



TRICUSPID VALVE:             NORMAL

MITRAL VALVE:  MITRAL ANNULAR CALCIFICTATION



PULMONIC VALVE:             NORMAL

AORTIC VALVE:     SCLEROSIS



PERICARDIAL EFFUSION: NONE

AORTIC ROOT:      NORMAL





LEFT VENTRICULAR WALL MOTION:     MODERATE GLOBAL HYPOKINESIS.



DOPPLER/COLOR FLOW:     MILD TRICUSPID REGURGITATION. NORMAL RIGHT VENTRICULAR SYSTOLIC 
PRESSURE. 



COMMENTS:      MODERATE GLOBAL HYPOKINESIS. MITRAL ANNULAR CALCIFICTATION. AORTIC 
SCLEROSIS WITH NO STENOSIS. MILD TRICUSPID REGURGITATION. NORMAL RIGHT VENTRICULAR 
SYSTOLIC PRESSURE. 



TECHNOLOGIST:   TALAT VILLAREAL

## 2019-12-17 ENCOUNTER — HOSPITAL ENCOUNTER (EMERGENCY)
Dept: HOSPITAL 97 - ER | Age: 84
End: 2019-12-17
Payer: COMMERCIAL

## 2019-12-17 DIAGNOSIS — Z86.73: ICD-10-CM

## 2019-12-17 DIAGNOSIS — I46.9: Primary | ICD-10-CM

## 2019-12-17 PROCEDURE — 99285 EMERGENCY DEPT VISIT HI MDM: CPT

## 2019-12-17 PROCEDURE — 96374 THER/PROPH/DIAG INJ IV PUSH: CPT

## 2019-12-17 PROCEDURE — 96375 TX/PRO/DX INJ NEW DRUG ADDON: CPT

## 2019-12-17 PROCEDURE — 82947 ASSAY GLUCOSE BLOOD QUANT: CPT

## 2019-12-17 PROCEDURE — 92950 HEART/LUNG RESUSCITATION CPR: CPT

## 2019-12-17 NOTE — ER
Nurse's Notes                                                                                     

 [facility]                                                                 

Name: Nic Plummer                                                                             

Age: 84 yrs                                                                                       

Sex: Female                                                                                       

: 1935                                                                                   

MRN: [id number]                                                                                   

Arrival Date: 2019                                                                          

Time: 11:55                                                                                       

Account#: [id number]                                                                            

Bed 3                                                                                             

Private MD:                                                                                       

Diagnosis: Cardiopulmonary arrest                                                                 

                                                                                                  

Presentation:                                                                                     

                                                                                             

11:46 Presenting complaint: EMS states: Patient was being given a shower by two of the aides  vc  

      at the nursing home when she stated she felt weak and fell unresponsive approximately       

      20 minutes ago. CPR in progress at this time.                                               

11:46 Care prior to arrival: Assisted ventilation, Oral intubation, CPR via thumper           vc  

      Medication(s) given: 3 rounds of epi, 1 bicarb, and one calcium chloride administered       

      via EMS. Compressions began prior to arrival.                                               

11:46 Method Of Arrival: EMS: [facility]                                                       vc  

11:46 Acuity: KEMAR 1                                                                           vc  

11:46 Transition of care: patient was received from another setting of care (long-term care     

      facility), [facility]. Onset of symptoms was 2019. Risk           

      Assessment: Do you want to hurt yourself or someone else? Unable to obtain. Initial         

      Sepsis Screen: Does the patient meet any 2 criteria? No. Patient's initial sepsis           

      screen is negative. Does the patient have a suspected source of infection? No.              

      Patient's initial sepsis screen is negative.                                                

                                                                                                  

- Family history:: not pertinent.                                                                 

- Hospitalizations: : No recent hospitalization is reported.                                      

- History obtained from: EMS.                                                                     

                                                                                                  

                                                                                                  

Assessment:                                                                                       

11:46 CPR assessment: unresponsive, pupils fixed \T\ dilated, no respiratory effort, intubated, vc

      Ambu ventilation, pulses present w/ compressions.                                           

11:46 Neuro: Level of Consciousness is unresponsive. Cardiovascular: CPR in progress..        vc  

      Respiratory: Airway via oral intubation Assisted ventilation via ET tube by RT. ET tube     

      size 6.5.                                                                                   

11:46 GI: Abdomen is round obese. : Noyola in place to gravity drainage Urine is cloudy.     vc  

      Derm: Bruising that is dark purple. Musculoskeletal: Swelling present in anasarca noted.    

11:47 Cardiac rhythm is asystole, CPR continued.                                              vc  

11:49 CPR assessment: Central pulses absent, asystole on monitor, CPR continued.              vc  

11:51 CPR assessment: V-fib on monitor, defibrillation at 200 joules. CPR continued.          vc  

11:55 CPR assessment: Idioventricular rhythm on monitor, weak central pulse palpated by MD,   vc  

      CPR continued.                                                                              

11:56 CPR assessment: Ultrasound performed at bedside by MD, pulseless electrical activity    vc  

      noted, CPR continued.                                                                       

11:57 CPR assessment:. CPR assessment: pulses present w/ compressions.                        vc  

11:58 General: MD speaking with family at this time..                                         vc  

11:59 CPR assessment: pulses present w/ compressions, PEA on monitor, central pulses absent,  vc  

      CPR continued.                                                                              

12:00 CPR assessment: CPR stopped per MD, per patients family request to stop CPR at this     vc  

      time.                                                                                       

12:31 Reassessment: Called [facility], referral number [id number]-, patient was not a          vc  

      candidate..                                                                                 

12:31 Reassessment: Patients family at bedside, next of kin, Debora Marley..                 vc  

12:35 Reassessment: Officer Amanda at bedside .                                               aa5 

13:34 Reassessment: Awaiting  home..                                                   vc  

                                                                                                  

ED Course:                                                                                        

11:55 Patient arrived in ED.                                                                  bd  

12:05 Obey Rhoades MD is Attending Physician.                                             wa  

12:08 notified [facility] pd to notify  on call.                                              bd  

12:26 spoke with judge Ramos, Dr Hassan will sign the death certificate, pt can be released   bd  

      to familys choice of  home.                                                          

13:00 Triage completed.                                                                       vc  

13:23 Obey Rhoades MD is Pronouncing Provider.                                            wa  

                                                                                                  

Administered Medications:                                                                         

11:50 Drug: EPINEPHrine 0.1mg/mL 1:10,000 1 mg Route: IVP; Site: left antecubital;            vc  

11:51 Drug: D50W 50 ml Route: IVP; Site: left antecubital;                                    vc  

11:54 Drug: Atropine 1 mg Route: IVP; Site: left antecubital;                                 vc  

11:55 Drug: EPINEPHrine 0.1mg/mL 1:10,000 1 mg Route: IVP; Site: left antecubital;            vc  

11:58 Drug: EPINEPHrine 0.1mg/mL 1:10,000 1 mg Route: IVP; Site: left antecubital;            vc  

11:59 Drug: D50W 50 ml {Note: 1/2 amp administered..} Route: IVP; Site: left antecubital;     vc  

                                                                                                  

                                                                                                  

Outcome:                                                                                          

12:00 Outcome Patient                                                                  vc  

12:00 Patient :  Time of death 12:00 Pronounced by Obey Rhoades MD                       

12:00  Condition:                                                                      vc  

14:03 Patient left the ED.                                                                      

                                                                                                  

Signatures:                                                                                       

Shoshana Maher Audri, RN RN   aa5                                                  

Vanita Garza RN RN                                                      

Obey Rhoades MD MD wa Calcote, Vanessa, RN RN vc                                                   

                                                                                                  

Corrections: (The following items were deleted from the chart)                                    

12:09 12:06 Ortho called [facility] pd to notify  on call. dmitry andres  

                                                                                                  

**************************************************************************************************

## 2019-12-17 NOTE — EDPHYS
Physician Documentation                                                                           

 [facility]                                                                 

Name: Nic Plummer                                                                             

Age: 84 yrs                                                                                       

Sex: Female                                                                                       

: 1935                                                                                   

MRN: [id number]                                                                                   

Arrival Date: 2019                                                                          

Time: 11:55                                                                                       

Account#: [id number]                                                                            

Bed 3                                                                                             

Private MD:                                                                                       

ED Physician Obey Rhoades                                                                      

HPI:                                                                                              

                                                                                             

12:06 This 84 yrs old  Female presents to ER via Unassigned with complaints of found  wa  

      down. CPR .                                                                                 

12:06 Preceding the arrest, the patient collapsed, pt apparently s/p recent stroke, was       wa  

      receiving a bath this morning and collapsed. per EMS, BG 68. was in asystole at their       

      arrival. pt intubated. given epi and bicarb in route to ED. noted vfib once in the          

      field. in ED noted asytole. The arrest occurred at nursing home. Pre-hospital course:       

      The arrest was witnessed by nursing home staff. Bystanders at the scene did not perform     

      CPR. EMS care prior to arrival: initiation of ACLS, peripheral IV, intubation oxygen,       

      ACLS details: as noted above. The patient has not experienced similar symptoms in the       

      past. The patient has not recently seen a physician. as noted above. .                      

                                                                                                  

- Family history:: not pertinent.                                                                 

- Hospitalizations: : No recent hospitalization is reported.                                      

- History obtained from: EMS.                                                                     

                                                                                                  

                                                                                                  

ROS:                                                                                              

12:11 Unable to obtain ROS due to comatose state.                                             wa  

13:07 Back: Negative for injury and pain.                                                     wa  

                                                                                                  

Exam:                                                                                             

12:11 Head/Face:  Normocephalic, atraumatic. ENT:  Nares patent. No nasal discharge, no       wa  

      septal abnormalities noted.  Tympanic membranes are normal and external auditory canals     

      are clear.  Oropharynx with no redness, swelling, or masses, exudates, or evidence of       

      obstruction, uvula midline.  Mucous membranes moist. Abdomen/GI:  Soft, non-tender,         

      with normal bowel sounds.  No distension or tympany.  No guarding or rebound.  No           

      evidence of tenderness throughout. Back:  No spinal tenderness.  No costovertebral          

      tenderness.  Full range of motion. Skin:  Warm, dry with normal turgor.  Normal color       

      with no rashes, no lesions, and no evidence of cellulitis.                                  

12:11 Constitutional: The patient appears intubated. unresponsive                                 

12:11 Cardiovascular: Rate: none, Rhythm: idoventricular, Pulses: not palpable, Heart sounds:     

      none, Edema: is not appreciated, JVD: is not appreciated.                                   

                                                                                                  

MDM:                                                                                              

12:05 Patient medically screened.                                                             wa  

12:13 Differential diagnosis: cardiac arrest, cardiac arrest. will continue ACLS protocol and wa  

      reassess.                                                                                   

13:02 Data reviewed: vital signs, nurses notes. Special discussion: continued ACLS protocol.  wa  

      epi given Q3 hrs. noted intermittent idioventricular rhythm with occasional faint           

      pulses. received D50 bolus for BG of 68 in the field. gave a dose of atropine for           

      faint, slow rate. pt's drifted into a systole within a few seconds post epi. pt's           

      daughter arrived in ED and advised: "Mum says she does not want any of this." code          

      called off at 1200 hrs. bedside US of cardiac window showed only flickering leaflets in     

      chamber with no sustained cardiac monvement.                                                

13:10 Response to treatment: the patient's symptoms have worsened after treatment, .   wa  

                                                                                                  

                                                                                             

12:20 Order name: Glucose, Ancillary Testing; Complete Time: 13:35                            EDMS

                                                                                                  

Administered Medications:                                                                         

11:50 Drug: EPINEPHrine 0.1mg/mL 1:10,000 1 mg Route: IVP; Site: left antecubital;            vc  

11:51 Drug: D50W 50 ml Route: IVP; Site: left antecubital;                                    vc  

11:54 Drug: Atropine 1 mg Route: IVP; Site: left antecubital;                                 vc  

11:55 Drug: EPINEPHrine 0.1mg/mL 1:10,000 1 mg Route: IVP; Site: left antecubital;            vc  

11:58 Drug: EPINEPHrine 0.1mg/mL 1:10,000 1 mg Route: IVP; Site: left antecubital;            vc  

11:59 Drug: D50W 50 ml {Note: 1/2 amp administered..} Route: IVP; Site: left antecubital;     vc  

                                                                                                  

                                                                                                  

Disposition:                                                                                      

13:06 Critical Care:. .                                                                wa  

13:07 .                                                                                wa  

13:23 .                                                                                wa  

                                                                                                  

Disposition:                                                                                      

Patient pronounced on 19 12:00 by Obey Rhoades. Impression: Cardiopulmonary              

  arrest.                                                                                         

- Released to Medical Examiner.                                                                   

                                                                                                  

                                                                                                  

                                                                                                  

Critical care time excluding procedures:                                                          

13:06 Critical care time: Bedside Care: 20 minutes, Family Intervention: 10 minutes. Total    wa  

      time: 30 minutes                                                                            

                                                                                                  

Signatures:                                                                                       

Vanita Garza RN RN                                                      

Obey Rhoades MD MD wa Calcote, Vanessa, RN                    RN   vc                                                   

                                                                                                  

Corrections: (The following items were deleted from the chart)                                    

14:03 13:24 2019 13:24 Patient pronounced on 2019 at 12:00 by Obey Rhoades.    ss  

      Impression: Cardiopulmonary arrest. Released to Medical Examiner. wa                        

                                                                                                  

**************************************************************************************************

## 2019-12-17 NOTE — XMS REPORT
Patient Summary Document

 Created on:2019



Patient:MARCUS STERLING

Sex:Female

:1935

External Reference #:[id number]





Demographics







 Address  [address]



   [place] [address]

 

 Home Phone  [phone]

 

 Work Phone  [phone]

 

 Preferred Language  Unknown

 

 Marital Status  Unknown

 

 Amish Affiliation  Unknown

 

 Race  Unknown

 

 Additional Race(s)  Unavailable



   Unavailable

 

 Ethnic Group  Unknown









Author







 Organization  [facility]

 

 Address  [facility] [address]



   [place] [address]

 

 Phone  [phone]









Support







 Name  Relationship  Address  Phone

 

 ANABEL STERLING  Unavailable  [address]  [phone]



     [place] [address]  

 

 SANDI LOPEZ  Unavailable  [address]  [phone]



     [place] [address]  









Care Team Providers







 Name  Role  Phone

 

 Unavailable  Unavailable  Unavailable









Payers







 Payer Name  Policy Type  Policy Number  Effective Date  Expiration Date







Problems

This patient has no known problems.



Allergies, Adverse Reactions, Alerts







 Allergy Name  Allergy  Status  Severity  Reaction(s)  Onset  Inactive  
Treating  Comments



   Type        Date  Date  Clinician  

 

 Penicillins  DA  Active  MO    2019      



           00:00:0      



           0      

 

 Penicillins  DA  Active  MO    2019      



           00:00:0      



           0      







Medications

This patient has no known medications.



Results







 Test Description  Test Time  Test Comments  Text Results  Atomic Results  
Result Comments









 BASIC METABOLIC PANEL  2019 13:15:00    









   Test Item  Value  Reference Range  Comments









 SODIUM (test code=NA)  126 MMOL/L  137-145  

 

 POTASSIUM (test code=K)  4.8 MMOL/L  3.5-5.1  

 

 CHLORIDE (test code=CL)  90 MMOL/L    

 

 CARBON DIOXIDE (test code=CO2)  25 MMOL/L  22-30  

 

 GLUCOSE (test code=GLU)  135 MG/DL    

 

 BLOOD UREA NITROGEN (test  69 MG/DL  7-17  



 code=BUN)      

 

 GLOMERULAR FILTRATION RATE (test  24    Reporting units: ml/min/1.73 m2



 code=GFR)       (Modified MDRD



       Formula)Reference Range: >



       or=60 ml/min/1.73 m2

 

 CREATININE (test code=CREAT)  2.00 MG/DL  0.52-1.04  

 

 CALCIUM (test code=CA)  8.1 MG/DL  8.4-10.2  



PATIENT REFUSE THE BLOOD DRAW. NOTIFIED PATIENT CARE STAFF:DEMETRIO ON 19 
AT 13 BY [facility] [id number] 13:14:00





 Test Item  Value  Reference Range  Comments

 

 SODIUM (test code=NA)  126 MMOL/L  137-145  

 

 POTASSIUM (test code=K)  4.8 MMOL/L  3.5-5.1  

 

 CHLORIDE (test code=CL)  90 MMOL/L    

 

 CARBON DIOXIDE (test code=CO2)  25 MMOL/L  22-30  

 

 GLUCOSE (test code=GLU)   MG/DL    

 

 BLOOD UREA NITROGEN (test  69 MG/DL  7-17  



 code=BUN)      

 

 GLOMERULAR FILTRATION RATE  24    Reporting units: ml/min/1.73



 (test code=GFR)      m2  (Modified MDRD



       Formula)Reference Range: >



       or=60 ml/min/1.73 m2

 

 CREATININE (test code=CREAT)  2.00 MG/DL  0.52-1.04  

 

 CALCIUM (test code=CA)   MG/DL  8.7-9.7  



PATIENT REFUSE THE BLOOD DRAW. NOTIFIED PATIENT CARE STAFF:DEMETRIO ON 19 
 BY Z[facility] MET[facility]OLIC [id number] 13:11:00





 Test Item  Value  Reference Range  Comments

 

 SODIUM (test code=NA)  126 MMOL/L  137-145  

 

 POTASSIUM (test code=K)  4.8 MMOL/L  3.5-5.1  

 

 CHLORIDE (test code=CL)  90 MMOL/L    

 

 CARBON DIOXIDE (test code=CO2)   MMOL/L  22-30  

 

 GLUCOSE (test code=GLU)   MG/DL    

 

 BLOOD UREA NITROGEN (test code=BUN)   MG/DL  7-17  

 

 GLOMERULAR FILTRATION RATE (test code=GFR)      

 

 CREATININE (test code=CREAT)   MG/DL  0.52-1.04  

 

 CALCIUM (test code=CA)   MG/DL  8.7-9.7  



PATIENT REFUSE THE BLOOD DRAW. NOTIFIED PATIENT CARE STAFF:DEMETRIO ON 19 
 BY Z[facility] METABOLIC [id number] 17:41:00





 Test Item  Value  Reference Range  Comments

 

 SODIUM (test code=NA)  125 MMOL/L  137-145  

 

 POTASSIUM (test code=K)  4.1 MMOL/L  3.5-5.1  

 

 CHLORIDE (test code=CL)  89 MMOL/L    

 

 CARBON DIOXIDE (test code=CO2)  27 MMOL/L  22-30  

 

 ANION GAP (test code=GAP)  13 MMOL/L  14-24  

 

 GLUCOSE (test code=GLU)  150 MG/DL    

 

 BLOOD UREA NITROGEN (test  66 MG/DL    



 code=BUN)      

 

 GLOMERULAR FILTRATION RATE  22    Reporting units: ml/min/1.73



 (test code=GFR)      m2  (Modified MDRD



       Formula)Reference Range: >



       or=60 ml/min/1.73 m2

 

 CREATININE (test code=CREAT)  2.10 MG/DL  0.52-1.04  

 

 CALCIUM (test code=CA)  7.8 MG/DL  8.4-10.2  



BASIC METABOLIC [id number] 17:35:00





 Test Item  Value  Reference Range  Comments

 

 SODIUM (test code=NA)  125 MMOL/L  137-145  

 

 POTASSIUM (test code=K)  4.1 MMOL/L  3.5-5.1  

 

 CHLORIDE (test code=CL)  89 MMOL/L    

 

 CARBON DIOXIDE (test code=CO2)   MMOL/L  22-30  

 

 GLUCOSE (test code=GLU)   MG/DL    

 

 BLOOD UREA NITROGEN (test   MG/DL    



 code=BUN)      

 

 GLOMERULAR FILTRATION RATE  22    Reporting units: ml/min/1.73



 (test code=GFR)      m2  (Modified MDRD



       Formula)Reference Range: >



       or=60 ml/min/1.73 m2

 

 CREATININE (test code=CREAT)  2.10 MG/DL  0.52-1.04  

 

 CALCIUM (test code=CA)   MG/DL  8.7-9.7  



BASIC METABOLIC [id number] 17:33:00





 Test Item  Value  Reference Range  Comments

 

 SODIUM (test code=NA)  125 MMOL/L  137-145  

 

 POTASSIUM (test code=K)  4.1 MMOL/L  3.5-5.1  

 

 CHLORIDE (test code=CL)  89 MMOL/L    

 

 CARBON DIOXIDE (test code=CO2)   MMOL/L  22-30  

 

 GLUCOSE (test code=GLU)   MG/DL    

 

 BLOOD UREA NITROGEN (test code=BUN)   MG/DL  7-17  

 

 GLOMERULAR FILTRATION RATE (test code=GFR)      

 

 CREATININE (test code=CREAT)   MG/DL  0.52-1.04  

 

 CALCIUM (test code=CA)   MG/DL  8.7-9.7  



BASIC METABOLIC [id number] 17:32:00





 Test Item  Value  Reference Range  Comments

 

 SODIUM (test code=NA)   MMOL/L  137-145  

 

 POTASSIUM (test code=K)   MMOL/L  3.5-5.1  

 

 CHLORIDE (test code=CL)  89 MMOL/L    

 

 CARBON DIOXIDE (test code=CO2)   MMOL/L  22-30  

 

 GLUCOSE (test code=GLU)   MG/DL    

 

 BLOOD UREA NITROGEN (test code=BUN)   MG/DL  7-17  

 

 GLOMERULAR FILTRATION RATE (test code=GFR)      

 

 CREATININE (test code=CREAT)   MG/DL  0.52-1.04  

 

 CALCIUM (test code=CA)   MG/DL  8.7-9.7  



GLUCOSE BEDSIDE [id number] 16:31:00





 Test Item  Value  Reference Range  Comments

 

 GLUCOSE BEDSIDE TESTING (test code=GLUBED)  161 MG/DL  60-99  



GLUCOSE BEDSIDE [id number] 11:50:00





 Test Item  Value  Reference Range  Comments

 

 GLUCOSE BEDSIDE TESTING (test code=GLUBED)  121 MG/DL  60-99  



GLUCOSE BEDSIDE [id number] 11:36:00





 Test Item  Value  Reference Range  Comments

 

 GLUCOSE BEDSIDE TESTING (test code=GLUBED)  123 MG/DL  60-99  



GLUCOSE BEDSIDE [id number] 09:56:00





 Test Item  Value  Reference Range  Comments

 

 GLUCOSE BEDSIDE TESTING (test code=GLUBED)  161 MG/DL  60-99  



GLUCOSE BEDSIDE [id number] 09:56:00





 Test Item  Value  Reference Range  Comments

 

 GLUCOSE BEDSIDE TESTING (test code=GLUBED)  143 MG/DL  60-99  



BASIC METABOLIC [id number] 05:12:00





 Test Item  Value  Reference Range  Comments

 

 SODIUM (test code=NA)  125 MMOL/L  137-145  

 

 POTASSIUM (test code=K)  4.3 MMOL/L  3.5-5.1  

 

 CHLORIDE (test code=CL)  88 MMOL/L    

 

 CARBON DIOXIDE (test code=CO2)  28 MMOL/L  22-30  

 

 ANION GAP (test code=GAP)  13 MMOL/L  14-24  

 

 GLUCOSE (test code=GLU)  117 MG/DL    

 

 BLOOD UREA NITROGEN (test  65 MG/DL  7-17  



 code=BUN)      

 

 GLOMERULAR FILTRATION RATE  19    Reporting units: ml/min/1.73



 (test code=GFR)      m2  (Modified MDRD



       Formula)Reference Range: >



       or=60 ml/min/1.73 m2

 

 CREATININE (test code=CREAT)  2.40 MG/DL  0.52-1.04  

 

 CALCIUM (test code=CA)  8.0 MG/DL  8.4-10.2  



BASIC METABOLIC [id number] 05:01:00





 Test Item  Value  Reference Range  Comments

 

 SODIUM (test code=NA)  125 MMOL/L  137-145  

 

 POTASSIUM (test code=K)  4.3 MMOL/L  3.5-5.1  

 

 CHLORIDE (test code=CL)  88 MMOL/L    

 

 CARBON DIOXIDE (test code=CO2)   MMOL/L  22-30  

 

 GLUCOSE (test code=GLU)   MG/DL    

 

 BLOOD UREA NITROGEN (test   MG/DL  7-17  



 code=BUN)      

 

 GLOMERULAR FILTRATION RATE  19    Reporting units: ml/min/1.73



 (test code=GFR)      m2  (Modified MDRD



       Formula)Reference Range: >



       or=60 ml/min/1.73 m2

 

 CREATININE (test code=CREAT)  2.40 MG/DL  0.52-1.04  

 

 CALCIUM (test code=CA)   MG/DL  8.7-9.7  



BASIC METABOLIC [id number] 04:59:00





 Test Item  Value  Reference Range  Comments

 

 SODIUM (test code=NA)  125 MMOL/L  137-145  

 

 POTASSIUM (test code=K)  4.3 MMOL/L  3.5-5.1  

 

 CHLORIDE (test code=CL)  88 MMOL/L    

 

 CARBON DIOXIDE (test code=CO2)   MMOL/L  22-30  

 

 GLUCOSE (test code=GLU)   MG/DL    

 

 BLOOD UREA NITROGEN (test code=BUN)   MG/DL  7-17  

 

 GLOMERULAR FILTRATION RATE (test code=GFR)      

 

 CREATININE (test code=CREAT)   MG/DL  0.52-1.04  

 

 CALCIUM (test code=CA)   MG/DL  8.7-9.7  



BASIC METABOLIC [id number] 19:19:00





 Test Item  Value  Reference Range  Comments

 

 SODIUM (test code=NA)  123 MMOL/L  137-145  

 

 POTASSIUM (test code=K)  4.7 MMOL/L  3.5-5.1  

 

 CHLORIDE (test code=CL)  86 MMOL/L    

 

 CARBON DIOXIDE (test code=CO2)  26 MMOL/L  22-30  

 

 GLUCOSE (test code=GLU)  143 MG/DL    

 

 BLOOD UREA NITROGEN (test  68 MG/DL  7-17  



 code=BUN)      

 

 GLOMERULAR FILTRATION RATE  24    Reporting units: ml/min/1.73



 (test code=GFR)      m2  (Modified MDRD



       Formula)Reference Range: >



       or=60 ml/min/1.73 m2

 

 CREATININE (test code=CREAT)  2.00 MG/DL  0.52-1.04  

 

 CALCIUM (test code=CA)  8.1 MG/DL  8.4-10.2  



BASIC METABOLIC [id number] 19:18:00





 Test Item  Value  Reference Range  Comments

 

 SODIUM (test code=NA)  123 MMOL/L  137-145  

 

 POTASSIUM (test code=K)  4.7 MMOL/L  3.5-5.1  

 

 CHLORIDE (test code=CL)  86 MMOL/L    

 

 CARBON DIOXIDE (test code=CO2)  26 MMOL/L  22-30  

 

 GLUCOSE (test code=GLU)   MG/DL    

 

 BLOOD UREA NITROGEN (test   MG/DL  7-17  



 code=BUN)      

 

 GLOMERULAR FILTRATION RATE  24    Reporting units: ml/min/1.73



 (test code=GFR)      m2  (Modified MDRD



       Formula)Reference Range: >



       or=60 ml/min/1.73 m2

 

 CREATININE (test code=CREAT)  2.00 MG/DL  0.52-1.04  

 

 CALCIUM (test code=CA)   MG/DL  8.7-9.7  



BASIC METABOLIC [id number] 19:16:00





 Test Item  Value  Reference Range  Comments

 

 SODIUM (test code=NA)  123 MMOL/L  137-145  

 

 POTASSIUM (test code=K)  4.7 MMOL/L  3.5-5.1  

 

 CHLORIDE (test code=CL)  86 MMOL/L    

 

 CARBON DIOXIDE (test code=CO2)   MMOL/L  22-30  

 

 GLUCOSE (test code=GLU)   MG/DL    

 

 BLOOD UREA NITROGEN (test code=BUN)   MG/DL  7-17  

 

 GLOMERULAR FILTRATION RATE (test code=GFR)      

 

 CREATININE (test code=CREAT)   MG/DL  0.52-1.04  

 

 CALCIUM (test code=CA)   MG/DL  8.7-9.7  



BASIC METABOLIC [id number] 19:15:00





 Test Item  Value  Reference Range  Comments

 

 SODIUM (test code=NA)  123 MMOL/L  137-145  

 

 POTASSIUM (test code=K)   MMOL/L  3.5-5.1  

 

 CHLORIDE (test code=CL)  86 MMOL/L    

 

 CARBON DIOXIDE (test code=CO2)   MMOL/L  22-30  

 

 GLUCOSE (test code=GLU)   MG/DL    

 

 BLOOD UREA NITROGEN (test code=BUN)   MG/DL  7-17  

 

 GLOMERULAR FILTRATION RATE (test code=GFR)      

 

 CREATININE (test code=CREAT)   MG/DL  0.52-1.04  

 

 CALCIUM (test code=CA)   MG/DL  8.7-9.7  



GLUCOSE BEDSIDE [id number] 16:02:00





 Test Item  Value  Reference Range  Comments

 

 GLUCOSE BEDSIDE TESTING (test code=GLUBED)  132 MG/DL  60-99  



UR SODIUM   [id number] 14:29:00





 Test Item  Value  Reference Range  Comments

 

 UR SODIUM RANDOM (test code=TERA)  < 5 MMOL/L    

 

 UR POTASSIUM RANDOM (test code=KU)  26.4 MMOL/L    



UR OSMOLALITY [id number] 14:29:00





 Test Item  Value  Reference Range  Comments

 

 UR OSMOLALITY RANDOM (test code=OSMOU)  290.5 MOS/KG  300-1200  



UR SODIUM   [id number] 13:20:00





 Test Item  Value  Reference Range  Comments

 

 UR SODIUM RANDOM (test code=TERA)  < 5 MMOL/L    

 

 UR POTASSIUM RANDOM (test code=KU)  26.4 MMOL/L    



UR OSMOLALITY [id number] 13:20:00





 Test Item  Value  Reference Range  Comments

 

 UR OSMOLALITY RANDOM (test code=OSMOU)   MOS/KG  300-1200  



UR CHLORIDE [id number] 13:17:00





 Test Item  Value  Reference Range  Comments

 

 UR CHLORIDE RANDOM (test code=CLU)  < 15 MMOL/L    



GLUCOSE BEDSIDE [id number] 11:47:00





 Test Item  Value  Reference Range  Comments

 

 GLUCOSE BEDSIDE TESTING (test code=GLUBED)  114 MG/DL  60-99  



GLUCOSE BEDSIDE [id number] 08:30:00





 Test Item  Value  Reference Range  Comments

 

 GLUCOSE BEDSIDE TESTING (test code=GLUBED)  131 MG/DL  60-99  



[id number] 00:47:00





 Test Item  Value  Reference Range  Comments

 

 SODIUM (test code=NA)  122 MMOL/L  137-145  



Specimen comments:     PLEASE NOTIFY ME SODIUM RESULTSGLUCOSE BEDSIDE 
[id number] 00:25:00





 Test Item  Value  Reference Range  Comments

 

 GLUCOSE BEDSIDE TESTING (test code=GLUBED)  130 MG/DL  60-99  



GLUCOSE BEDSIDE [id number] 19:18:00





 Test Item  Value  Reference Range  Comments

 

 GLUCOSE BEDSIDE TESTING (test code=GLUBED)  147 MG/DL  60-99  



[id number] 19:07:00





 Test Item  Value  Reference Range  Comments

 

 SODIUM (test code=NA)  123 MMOL/L  137-145  



Specimen comments:     PLEASE NOTIFY ME SODIUM RESULTSGLUCOSE BEDSIDE 
[id number] 12:24:00





 Test Item  Value  Reference Range  Comments

 

 GLUCOSE BEDSIDE TESTING (test code=GLUBED)  167 MG/DL  60-99  



[id number] 10:32:00





 Test Item  Value  Reference Range  Comments

 

 SODIUM (test code=NA)  123 MMOL/L  137-145  



Specimen comments:     PLEASE NOTIFY ME SODIUM RESULTSBASIC METABOLIC [id number] 07:19:00





 Test Item  Value  Reference Range  Comments

 

 SODIUM (test code=NA)  120 MMOL/L  137-145  CALLED TO COLUM[facility].O& READBACK



       ON 19 AT 0719 BY



       Toi Yuan

 

 POTASSIUM (test code=K)  4.9 MMOL/L  3.5-5.1  

 

 CHLORIDE (test code=CL)  84 MMOL/L    

 

 CARBON DIOXIDE (test code=CO2)  25 MMOL/L  22-30  

 

 ANION GAP (test code=GAP)  16 MMOL/L  14-24  

 

 GLUCOSE (test code=GLU)  137 MG/DL    

 

 BLOOD UREA NITROGEN (test  67 MG/DL  7-17  



 code=BUN)      

 

 GLOMERULAR FILTRATION RATE  24    Reporting units: ml/min/1.73



 (test code=GFR)      m2  (Modified MDRD



       Formula)Reference Range: >



       or=60 ml/min/1.73 m2

 

 CREATININE (test code=CREAT)  2.00 MG/DL  0.52-1.04  

 

 CALCIUM (test code=CA)  7.7 MG/DL  8.4-10.2  



[id number] 06:54:00





 Test Item  Value  Reference Range  Comments

 

 SODIUM (test code=NA)  121 MMOL/L  137-145  



Specimen comments:     PLEASE NOTIFY ME SODIUM RESULTSCBC W/AUTO [id number] 
06:47:00





 Test Item  Value  Reference Range  Comments

 

 WHITE BLOOD CELL (test code=WBC)  10.5 K/MM3  3.8-9.8  

 

 RED BLOOD CELL (test code=RBC)  3.64 M/MM3  3.58-4.97  

 

 HEMOGLOBIN (test code=HGB)  9.5 G/DL  11.2-14.9  

 

 HEMATOCRIT (test code=HCT)  30.1 %  33.2-43.5  

 

 MEAN CELL VOLUME (test code=MCV)  83 fL  80.7-99.1  

 

 MEAN CELL HGB (test code=MCH)  26.1 pg  27.0-34.1  

 

 MEAN CELL HGB CONCETRATION (test code=MCHC)  31.6 %  32.2-35.7  

 

 RED CELL DISTRIBUTION WIDTH (test code=RDW)  16.8 %  12.1-15.2  

 

 PLATELET COUNT (test code=PLT)  255 K/MM3  129-368  

 

 MEAN PLATELET VOLUME (test code=MPV)  10.3 fl  7.4-10.4  

 

 NEUTROPHIL % (test code=NT%)  80.7 %  43-75  

 

 IMMATURE GRANULOCYTE % (test code=IG%)  1.0 %  0.0-2.0  

 

 LYMPHOCYTE % (test code=LY%)  7.9 %  14-44  

 

 MONOCYTE % (test code=MO%)  9.5 %  4-13  

 

 EOSINOPHIL % (test code=EO%)  0.8 %  0-6  

 

 BASOPHIL % (test code=BA%)  0.1 %  0-2  

 

 NUCLEATED RBC % (test code=NRBC%)  0.2 %  0-1.0  

 

 NEUTROPHIL # (test code=NT#)  8.48 K/mm3  2.0-7.6  

 

 IMMATURE GRANULOCYTE # (test code=IG#)  0.10 x10 3/uL  0-0.03  

 

 LYMPHOCYTE # (test code=LY#)  0.83 K/mm3  1.0-3.8  

 

 MONOCYTE # (test code=MO#)  1.00 K/mm3  0.1-0.8  

 

 EOSINOPHIL # (test code=EO#)  0.08 K/mm3  0.0-0.2  

 

 BASOPHIL # (test code=BA#)  0.01 K/mm3  0.0-0.2  

 

 NUCLEATED RBC # (test code=NRBC#)  0.02 K/mm3  0.0-0.1  



[id number] 01:27:00





 Test Item  Value  Reference Range  Comments

 

 SODIUM (test code=NA)  120 MMOL/L  137-145  CALLED TO DANIELLE ESTEVEZ& READBACK ON



       19 AT 0127 BY



       Oral Petty



Specimen comments:     PLEASE NOTIFY ME SODIUM [id number] 22:01:00





 Test Item  Value  Reference Range  Comments

 

 SODIUM (test code=NA)  121 MMOL/L  137-145  



Specimen comments:     PLEASE NOTIFY ME SODIUM RESULTSGLUCOSE BEDSIDE 
[id number] 15:57:00





 Test Item  Value  Reference Range  Comments

 

 GLUCOSE BEDSIDE TESTING (test code=GLUBED)  174 MG/DL  60-99  



[id number] 12:44:00





 Test Item  Value  Reference Range  Comments

 

 SODIUM (test code=NA)  121 MMOL/L  137-145  



Specimen comments:     PLEASE NOTIFY ME SODIUM RESULTSGLUCOSE BEDSIDE 
[id number] 12:16:00





 Test Item  Value  Reference Range  Comments

 

 GLUCOSE BEDSIDE TESTING (test code=GLUBED)  161 MG/DL  60-99  



GLUCOSE BEDSIDE [id number] 09:57:00





 Test Item  Value  Reference Range  Comments

 

 GLUCOSE BEDSIDE TESTING (test code=GLUBED)  216 MG/DL  60-99  



GLUCOSE BEDSIDE [id number] 08:22:00





 Test Item  Value  Reference Range  Comments

 

 GLUCOSE BEDSIDE TESTING (test code=GLUBED)  153 MG/DL  60-99  



BASIC METABOLIC [id number] 06:50:00





 Test Item  Value  Reference Range  Comments

 

 SODIUM (test code=NA)  121 MMOL/L  137-145  

 

 POTASSIUM (test code=K)  4.1 MMOL/L  3.5-5.1  

 

 CHLORIDE (test code=CL)  82 MMOL/L    

 

 CARBON DIOXIDE (test code=CO2)  27 MMOL/L  22-30  

 

 ANION GAP (test code=GAP)  16 MMOL/L  14-24  

 

 GLUCOSE (test code=GLU)   MG/DL    

 

 BLOOD UREA NITROGEN (test   MG/DL  7-17  



 code=BUN)      

 

 GLOMERULAR FILTRATION RATE  18    Reporting units: ml/min/1.73



 (test code=GFR)      m2  (Modified MDRD



       Formula)Reference Range: >



       or=60 ml/min/1.73 m2

 

 CREATININE (test code=CREAT)  2.50 MG/DL  0.52-1.04  

 

 CALCIUM (test code=CA)   MG/DL  8.7-9.7  



BASIC METABOLIC [id number] 06:50:00





 Test Item  Value  Reference Range  Comments

 

 SODIUM (test code=NA)  121 MMOL/L  137-145  

 

 POTASSIUM (test code=K)  4.1 MMOL/L  3.5-5.1  

 

 CHLORIDE (test code=CL)  82 MMOL/L    

 

 CARBON DIOXIDE (test code=CO2)  27 MMOL/L  22-30  

 

 ANION GAP (test code=GAP)  16 MMOL/L  14-24  

 

 GLUCOSE (test code=GLU)  156 MG/DL    

 

 BLOOD UREA NITROGEN (test  61 MG/DL  7-17  



 code=BUN)      

 

 GLOMERULAR FILTRATION RATE  18    Reporting units: ml/min/1.73



 (test code=GFR)      m2  (Modified MDRD



       Formula)Reference Range: >



       or=60 ml/min/1.73 m2

 

 CREATININE (test code=CREAT)  2.50 MG/DL  0.52-1.04  

 

 CALCIUM (test code=CA)  7.5 MG/DL  8.4-10.2  



BASIC METABOLIC [id number] 06:49:00





 Test Item  Value  Reference Range  Comments

 

 SODIUM (test code=NA)  121 MMOL/L  137-145  

 

 POTASSIUM (test code=K)  4.1 MMOL/L  3.5-5.1  

 

 CHLORIDE (test code=CL)  82 MMOL/L    

 

 CARBON DIOXIDE (test code=CO2)   MMOL/L  22-30  

 

 GLUCOSE (test code=GLU)   MG/DL    

 

 BLOOD UREA NITROGEN (test   MG/DL  17  



 code=BUN)      

 

 GLOMERULAR FILTRATION RATE  18    Reporting units: ml/min/1.73



 (test code=GFR)      m2  (Modified MDRD



       Formula)Reference Range: >



       or=60 ml/min/1.73 m2

 

 CREATININE (test code=CREAT)  2.50 MG/DL  0.52-1.04  

 

 CALCIUM (test code=CA)   MG/DL  8.7-9.7  



BASIC METABOLIC [id number] 06:47:00





 Test Item  Value  Reference Range  Comments

 

 SODIUM (test code=NA)  121 MMOL/L  137-145  

 

 POTASSIUM (test code=K)  4.1 MMOL/L  3.5-5.1  

 

 CHLORIDE (test code=CL)  82 MMOL/L    

 

 CARBON DIOXIDE (test code=CO2)   MMOL/L  22-30  

 

 GLUCOSE (test code=GLU)   MG/DL    

 

 BLOOD UREA NITROGEN (test code=BUN)   MG/DL  7-17  

 

 GLOMERULAR FILTRATION RATE (test code=GFR)      

 

 CREATININE (test code=CREAT)   MG/DL  0.52-1.04  

 

 CALCIUM (test code=CA)   MG/DL  8.7-9.7  



BASIC METABOLIC [id number] 06:46:00





 Test Item  Value  Reference Range  Comments

 

 SODIUM (test code=NA)   MMOL/L  137-145  

 

 POTASSIUM (test code=K)   MMOL/L  3.5-5.1  

 

 CHLORIDE (test code=CL)  82 MMOL/L    

 

 CARBON DIOXIDE (test code=CO2)   MMOL/L  22-30  

 

 GLUCOSE (test code=GLU)   MG/DL    

 

 BLOOD UREA NITROGEN (test code=BUN)   MG/DL  7-17  

 

 GLOMERULAR FILTRATION RATE (test code=GFR)      

 

 CREATININE (test code=CREAT)   MG/DL  0.52-1.04  

 

 CALCIUM (test code=CA)   MG/DL  8.7-9.7  



CBC W/AUTO [id number] 06:22:00





 Test Item  Value  Reference Range  Comments

 

 WHITE BLOOD CELL (test code=WBC)  12.3 K/MM3  3.8-9.8  

 

 RED BLOOD CELL (test code=RBC)  3.62 M/MM3  3.58-4.97  

 

 HEMOGLOBIN (test code=HGB)  9.6 G/DL  11.2-14.9  

 

 HEMATOCRIT (test code=HCT)  30.6 %  33.2-43.5  

 

 MEAN CELL VOLUME (test code=MCV)  85 fL  80.7-99.1  

 

 MEAN CELL HGB (test code=MCH)  26.5 pg  27.0-34.1  

 

 MEAN CELL HGB CONCETRATION (test code=MCHC)  31.4 %  32.2-35.7  

 

 RED CELL DISTRIBUTION WIDTH (test code=RDW)  16.6 %  12.1-15.2  

 

 PLATELET COUNT (test code=PLT)  271 K/MM3  129-368  

 

 MEAN PLATELET VOLUME (test code=MPV)  10.6 fl  7.4-10.4  

 

 NEUTROPHIL % (test code=NT%)  80.2 %  43-75  

 

 IMMATURE GRANULOCYTE % (test code=IG%)  1.1 %  0.0-2.0  

 

 LYMPHOCYTE % (test code=LY%)  7.8 %  14-44  

 

 MONOCYTE % (test code=MO%)  10.0 %  4-13  

 

 EOSINOPHIL % (test code=EO%)  0.8 %  0-6  

 

 BASOPHIL % (test code=BA%)  0.1 %  0-2  

 

 NUCLEATED RBC % (test code=NRBC%)  0.2 %  0-1.0  

 

 NEUTROPHIL # (test code=NT#)  9.82 K/mm3  2.0-7.6  

 

 IMMATURE GRANULOCYTE # (test code=IG#)  0.14 x10 3/uL  0-0.03  

 

 LYMPHOCYTE # (test code=LY#)  0.96 K/mm3  1.0-3.8  

 

 MONOCYTE # (test code=MO#)  1.22 K/mm3  0.1-0.8  

 

 EOSINOPHIL # (test code=EO#)  0.10 K/mm3  0.0-0.2  

 

 BASOPHIL # (test code=BA#)  0.01 K/mm3  0.0-0.2  

 

 NUCLEATED RBC # (test code=NRBC#)  0.03 K/mm3  0.0-0.1  



GLUCOSE BEDSIDE [id number] 20:40:00





 Test Item  Value  Reference Range  Comments

 

 GLUCOSE BEDSIDE TESTING (test code=GLUBED)  212 MG/DL  60-99  



BASIC METABOLIC [id number] 19:03:00





 Test Item  Value  Reference Range  Comments

 

 SODIUM (test code=NA)  122 MMOL/L  137-145  

 

 POTASSIUM (test code=K)  4.3 MMOL/L  3.5-5.1  

 

 CHLORIDE (test code=CL)  83 MMOL/L    

 

 CARBON DIOXIDE (test code=CO2)  28 MMOL/L  22-30  

 

 GLUCOSE (test code=GLU)  182 MG/DL    

 

 BLOOD UREA NITROGEN (test  63 MG/DL  7-17  



 code=BUN)      

 

 GLOMERULAR FILTRATION RATE  18    Reporting units: ml/min/1.73



 (test code=GFR)      m2  (Modified MDRD



       Formula)Reference Range: >



       or=60 ml/min/1.73 m2

 

 CREATININE (test code=CREAT)  2.50 MG/DL  0.52-1.04  

 

 CALCIUM (test code=CA)  7.6 MG/DL  8.4-10.2  



BASIC METABOLIC [id number] 19:00:00





 Test Item  Value  Reference Range  Comments

 

 SODIUM (test code=NA)  122 MMOL/L  137-145  

 

 POTASSIUM (test code=K)  4.3 MMOL/L  3.5-5.1  

 

 CHLORIDE (test code=CL)  83 MMOL/L    

 

 CARBON DIOXIDE (test code=CO2)   MMOL/L  22-30  

 

 GLUCOSE (test code=GLU)   MG/DL    

 

 BLOOD UREA NITROGEN (test code=BUN)   MG/DL  7-17  

 

 GLOMERULAR FILTRATION RATE (test code=GFR)      

 

 CREATININE (test code=CREAT)   MG/DL  0.52-1.04  

 

 CALCIUM (test code=CA)   MG/DL  8.7-9.7  



GLUCOSE BEDSIDE [id number] 17:10:00





 Test Item  Value  Reference Range  Comments

 

 GLUCOSE BEDSIDE TESTING (test code=GLUBED)  185 MG/DL  60-99  



GLUCOSE BEDSIDE [id number] 11:28:00





 Test Item  Value  Reference Range  Comments

 

 GLUCOSE BEDSIDE TESTING (test code=GLUBED)  181 MG/DL  60-99  



- XR CHEST [id number] -[id number] 09:25:00  Patient Name: MARCUS STERLING             
   Unit No: [id number]          EXAMS:                              CPT CODE:  
     [id number] XR CHEST 2 V                               32239                
    HISTORY: Follow-up pleural effusion               Location code: B2    
FINDINGS: Frontal view of the chest demonstrates a moderately enlarged       
cardiomediastinal silhouette.  Central venous congestion with mild       
interstitial edema.  The trachea is midline.  Small right effusion.        No 
pneumothorax.  Aortic atherosclerosis.  The bones are intact.       IMPRESSION: 
Moderate cardiomegaly and central venous congestion with a         small right 
effusion.          ** Electronically Signed by OVIDIO Leal on 2019 at 
0925 **    Reported and signed by: Scott Leal M.D.                              
      CC: Gustavo Sosa;Berny No MD                  Technologist: La Nguyen (RT)(R); Matthew David (RT)(R)          Transcrpt Date/Tm/Trnsp: 2019 (09) HeidiR.[id number]                 Orig Print D/T: S: 2019 (928)     
                [facility]                           NAME: MARCUS STERLING    
            [address] [address]                      PHYS: Berny Rush MD    
        [place] [address]     : 1935 AGE: 84      SEX: F               
                        ACCT NO: [id number]: [id number] A      PHONE #: 
[phone]               EXAM DATE: 2019 STATUS: ADM IN     FAX #: 
[phone]               RADIOLOGY NO:            PAGE  1                    
   Signed ReportGLUCOSE BEDSIDE [id number] 07:54:00





 Test Item  Value  Reference Range  Comments

 

 GLUCOSE BEDSIDE TESTING (test code=GLUBED)  141 MG/DL  60-99  



BASIC METABOLIC [id number] 07:14:00





 Test Item  Value  Reference Range  Comments

 

 SODIUM (test code=NA)  122 MMOL/L  137-145  

 

 POTASSIUM (test code=K)  4.1 MMOL/L  3.5-5.1  

 

 CHLORIDE (test code=CL)  84 MMOL/L    

 

 CARBON DIOXIDE (test code=CO2)  26 MMOL/L  22-30  

 

 GLUCOSE (test code=GLU)  161 MG/DL    

 

 BLOOD UREA NITROGEN (test  60 MG/DL  7-17  



 code=BUN)      

 

 GLOMERULAR FILTRATION RATE  19    Reporting units: ml/min/1.73



 (test code=GFR)      m2  (Modified MDRD



       Formula)Reference Range: >



       or=60 ml/min/1.73 m2

 

 CREATININE (test code=CREAT)  2.40 MG/DL  0.52-1.04  

 

 CALCIUM (test code=CA)  7.5 MG/DL  8.4-10.2  



BASIC METABOLIC [id number] 07:13:00





 Test Item  Value  Reference Range  Comments

 

 SODIUM (test code=NA)  122 MMOL/L  137-145  

 

 POTASSIUM (test code=K)  4.1 MMOL/L  3.5-5.1  

 

 CHLORIDE (test code=CL)  84 MMOL/L    

 

 CARBON DIOXIDE (test code=CO2)   MMOL/L  22-30  

 

 GLUCOSE (test code=GLU)   MG/DL    

 

 BLOOD UREA NITROGEN (test   MG/DL  7-17  



 code=BUN)      

 

 GLOMERULAR FILTRATION RATE  19    Reporting units: ml/min/1.73



 (test code=GFR)      m2  (Modified MDRD



       Formula)Reference Range: >



       or=60 ml/min/1.73 m2

 

 CREATININE (test code=CREAT)  2.40 MG/DL  0.52-1.04  

 

 CALCIUM (test code=CA)   MG/DL  8.7-9.7  



BASIC METABOLIC [id number] 07:11:00





 Test Item  Value  Reference Range  Comments

 

 SODIUM (test code=NA)  122 MMOL/L  137-145  

 

 POTASSIUM (test code=K)  4.1 MMOL/L  3.5-5.1  

 

 CHLORIDE (test code=CL)  84 MMOL/L    

 

 CARBON DIOXIDE (test code=CO2)   MMOL/L  22-30  

 

 GLUCOSE (test code=GLU)   MG/DL    

 

 BLOOD UREA NITROGEN (test code=BUN)   MG/DL  7-17  

 

 GLOMERULAR FILTRATION RATE (test code=GFR)      

 

 CREATININE (test code=CREAT)   MG/DL  0.52-1.04  

 

 CALCIUM (test code=CA)   MG/DL  8.7-9.7  



BASIC METABOLIC [id number] 07:10:00





 Test Item  Value  Reference Range  Comments

 

 SODIUM (test code=NA)   MMOL/L  137-145  

 

 POTASSIUM (test code=K)   MMOL/L  3.5-5.1  

 

 CHLORIDE (test code=CL)  84 MMOL/L    

 

 CARBON DIOXIDE (test code=CO2)   MMOL/L  22-30  

 

 GLUCOSE (test code=GLU)   MG/DL    

 

 BLOOD UREA NITROGEN (test code=BUN)   MG/DL  7-17  

 

 GLOMERULAR FILTRATION RATE (test code=GFR)      

 

 CREATININE (test code=CREAT)   MG/DL  0.52-1.04  

 

 CALCIUM (test code=CA)   MG/DL  8.7-9.7  



CBC W/AUTO [id number] 06:26:00





 Test Item  Value  Reference Range  Comments

 

 WHITE BLOOD CELL (test code=WBC)  10.1 K/MM3  3.8-9.8  

 

 RED BLOOD CELL (test code=RBC)  3.62 M/MM3  3.58-4.97  

 

 HEMOGLOBIN (test code=HGB)  9.5 G/DL  11.2-14.9  

 

 HEMATOCRIT (test code=HCT)  30.8 %  33.2-43.5  

 

 MEAN CELL VOLUME (test code=MCV)  85 fL  80.7-99.1  

 

 MEAN CELL HGB (test code=MCH)  26.2 pg  27.0-34.1  

 

 MEAN CELL HGB CONCETRATION (test code=MCHC)  30.8 %  32.2-35.7  

 

 RED CELL DISTRIBUTION WIDTH (test code=RDW)  16.5 %  12.1-15.2  

 

 PLATELET COUNT (test code=PLT)  280 K/MM3  129-368  

 

 MEAN PLATELET VOLUME (test code=MPV)  10.8 fl  7.4-10.4  

 

 NEUTROPHIL % (test code=NT%)  74.1 %  43-75  

 

 IMMATURE GRANULOCYTE % (test code=IG%)  1.2 %  0.0-2.0  

 

 LYMPHOCYTE % (test code=LY%)  10.1 %  14-44  

 

 MONOCYTE % (test code=MO%)  13.5 %  4-13  

 

 EOSINOPHIL % (test code=EO%)  1.1 %  0-6  

 

 BASOPHIL % (test code=BA%)  0 %  0-2  

 

 NUCLEATED RBC % (test code=NRBC%)  0.2 %  0-1.0  

 

 NEUTROPHIL # (test code=NT#)  7.51 K/mm3  2.0-7.6  

 

 IMMATURE GRANULOCYTE # (test code=IG#)  0.12 x10 3/uL  0-0.03  

 

 LYMPHOCYTE # (test code=LY#)  1.02 K/mm3  1.0-3.8  

 

 MONOCYTE # (test code=MO#)  1.37 K/mm3  0.1-0.8  

 

 EOSINOPHIL # (test code=EO#)  0.11 K/mm3  0.0-0.2  

 

 BASOPHIL # (test code=BA#)  0 K/mm3  0.0-0.2  

 

 NUCLEATED RBC # (test code=NRBC#)  0.02 K/mm3  0.0-0.1  



GLUCOSE BEDSIDE [id number] 20:37:00





 Test Item  Value  Reference Range  Comments

 

 GLUCOSE BEDSIDE TESTING (test code=GLUBED)  254 MG/DL  60-99  



PROTHROMBIN [id number] 17:12:00





 Test Item  Value  Reference Range  Comments

 

 PROTHROMBIN TIME PATIENT (test  15.5 SECONDS  9.6-11.6  



 code=PTP)      

 

 INTERNATIONAL NORMAL RATIO  1.5  0.8-1.1  The INR is to be used only



 (test code=INR)      for monitoring oral



       anticoagulanttherapy.



       INDICATION



       



       INR



       VALUE---------------------



       --------------------------



       ------------1.



       Prophylaxis, deep venous



       thrombosis,    including



       high risk surgery.



                2.0 - 3.0 2.



       Prophylaxis, deep venous



       thrombosis,    hip



       surgery, treatment for



       deep venous    thrombosis



       or pulmonary prevention of



          systemic embolism in



       patients with    valvular



       heart disease, atrial



       fibrillation,    tissue



       heart valve, or acute



       myocardial    infarction.



       



            2.0 - 3.0 3.



       Mechanical prosthesis



       heart valves,    recurrent



       systemic embolism.



               3.0 - 4.5



CBC W/AUTO [id number] 17:00:00





 Test Item  Value  Reference Range  Comments

 

 WHITE BLOOD CELL (test code=WBC)  11.4 K/MM3  3.8-9.8  

 

 RED BLOOD CELL (test code=RBC)  3.80 M/MM3  3.58-4.97  

 

 HEMOGLOBIN (test code=HGB)  10.1 G/DL  11.2-14.9  

 

 HEMATOCRIT (test code=HCT)  32.5 %  33.2-43.5  

 

 MEAN CELL VOLUME (test code=MCV)  86 fL  80.7-99.1  

 

 MEAN CELL HGB (test code=MCH)  26.6 pg  27.0-34.1  

 

 MEAN CELL HGB CONCETRATION (test code=MCHC)  31.1 %  32.2-35.7  

 

 RED CELL DISTRIBUTION WIDTH (test code=RDW)  16.8 %  12.1-15.2  

 

 PLATELET COUNT (test code=PLT)  287 K/MM3  129-368  

 

 MEAN PLATELET VOLUME (test code=MPV)  10.5 fl  7.4-10.4  

 

 NEUTROPHIL % (test code=NT%)  76.6 %  43-75  

 

 IMMATURE GRANULOCYTE % (test code=IG%)  1.1 %  0.0-2.0  

 

 LYMPHOCYTE % (test code=LY%)  9.9 %  14-44  

 

 MONOCYTE % (test code=MO%)  11.9 %  4-13  

 

 EOSINOPHIL % (test code=EO%)  0.4 %  0-6  

 

 BASOPHIL % (test code=BA%)  0.1 %  0-2  

 

 NUCLEATED RBC % (test code=NRBC%)  0.2 %  0-1.0  

 

 NEUTROPHIL # (test code=NT#)  8.75 K/mm3  2.0-7.6  

 

 IMMATURE GRANULOCYTE # (test code=IG#)  0.13 x10 3/uL  0-0.03  

 

 LYMPHOCYTE # (test code=LY#)  1.13 K/mm3  1.0-3.8  

 

 MONOCYTE # (test code=MO#)  1.36 K/mm3  0.1-0.8  

 

 EOSINOPHIL # (test code=EO#)  0.04 K/mm3  0.0-0.2  

 

 BASOPHIL # (test code=BA#)  0.01 K/mm3  0.0-0.2  

 

 NUCLEATED RBC # (test code=NRBC#)  0.02 K/mm3  0.0-0.1  



GLUCOSE BEDSIDE [id number] 15:39:00





 Test Item  Value  Reference Range  Comments

 

 GLUCOSE BEDSIDE TESTING (test code=GLUBED)  199 MG/DL  60-99  



URINALYSIS [id number] 14:19:00





 Test Item  Value  Reference Range  Comments

 

 UA COLOR (test code=COLU)  RED  YELLOW  

 

 UA APPEARANCE (test code=APPU)  BLOODY  CLEAR  

 

 UA GLUCOSE DIPSTICK (test code=DGLUU)  NORMAL MG/DL  NORMAL  

 

 UA BILIRUBIN DIPSTICK (test code=BILU)  NEGATIVE MG/DL  NEGATIVE  

 

 UA KETONE DIPSTICK (test code=KETU)  NEGATIVE MG/DL  NEGATIVE  

 

 UA SPECIFIC GRAVITY (test code=SGU)  1.020  1.003-1.030  

 

 UA BLOOD DIPSTICK (test code=MELVIN)  250 Andre/mm3  NEGATIVE  

 

 UA PH DIPSTICK (test code=VALARIE)  5.0  5.0-9.0  

 

 UA PROTEIN DIPSTICK (test code=PROU)  100 MG/DL  NEGATIVE  

 

 UA UROBILINIOGEN DIPSTICK (test  NORMAL MG/DL  NORMAL  



 code=URO)      

 

 UA NITRITE DIPSTICK (test code=ANA ROSA)  NEGATIVE  NEGATIVE  

 

 UA LEUKOCYTE ESTERASE DIPSTICK (test  NEGATIVE /mm3  NEGATIVE  



 code=LEUU)      

 

 UA CULTURE NEEDED? (test code=UACULT)  NO, WBC<10 Criteria  Culture Chk  



SOURCE OF URINE: CLEAN CATCHUA [id number] 14:19:00





 Test Item  Value  Reference Range  Comments

 

 UA RBC (test code=RBCU)  >100 RBC/HPF  0-3  

 

 UA WBC (test code=XWBCU)  0-3 WBC/HPF  0-5  

 

 UA EPITHELIAL CELLS (test code=EPIU)  FEW EPI/HPF  FEW  

 

 UA BACTERIA (test code=XBACU)  FEW  NONE  



SOURCE OF URINE: CLEAN CATCHUR SODIUM [id number] 14:19:00





 Test Item  Value  Reference Range  Comments

 

 UR SODIUM RANDOM (test code=TERA)  16 MMOL/L    



SOURCE OF URINE: CLEAN CATCHUR CHLORIDE [id number] 14:19:00





 Test Item  Value  Reference Range  Comments

 

 UR CHLORIDE RANDOM (test code=CLU)  < 15 MMOL/L    



SOURCE OF URINE: CLEAN CATCHUR OSMOLALITY [id number] 14:19:00





 Test Item  Value  Reference Range  Comments

 

 UR OSMOLALITY RANDOM (test code=OSMOU)  418 MOS/KG  300-1200  



SOURCE OF URINE: CLEAN CATCHUR SMEAR EOSINOPHIL [id number] 12:46:00





 Test Item  Value  Reference Range  Comments

 

 UR SMEAR EOSINOPHIL COUNT (test code=EOSCTU)  NONE  RARE  



URINALYSIS [id number] 12:14:00





 Test Item  Value  Reference Range  Comments

 

 UA COLOR (test code=COLU)  RED  YELLOW  

 

 UA APPEARANCE (test code=APPU)  BLOODY  CLEAR  

 

 UA GLUCOSE DIPSTICK (test code=DGLUU)  NORMAL MG/DL  NORMAL  

 

 UA BILIRUBIN DIPSTICK (test code=BILU)  NEGATIVE MG/DL  NEGATIVE  

 

 UA KETONE DIPSTICK (test code=KETU)  NEGATIVE MG/DL  NEGATIVE  

 

 UA SPECIFIC GRAVITY (test code=SGU)  1.020  1.003-1.030  

 

 UA BLOOD DIPSTICK (test code=MELVIN)  250 Andre/mm3  NEGATIVE  

 

 UA PH DIPSTICK (test code=VALARIE)  5.0  5.0-9.0  

 

 UA PROTEIN DIPSTICK (test code=PROU)  100 MG/DL  NEGATIVE  

 

 UA UROBILINIOGEN DIPSTICK (test  NORMAL MG/DL  NORMAL  



 code=URO)      

 

 UA NITRITE DIPSTICK (test code=ANA ROSA)  NEGATIVE  NEGATIVE  

 

 UA LEUKOCYTE ESTERASE DIPSTICK (test  NEGATIVE /mm3  NEGATIVE  



 code=LEUU)      

 

 UA CULTURE NEEDED? (test code=UACULT)  NO, WBC<10 Criteria  Culture Chk  



SOURCE OF URINE: CLEAN CATCHUA [id number] 12:14:00





 Test Item  Value  Reference Range  Comments

 

 UA RBC (test code=RBCU)  >100 RBC/HPF  0-3  

 

 UA WBC (test code=XWBCU)  0-3 WBC/HPF  0-5  

 

 UA EPITHELIAL CELLS (test code=EPIU)  FEW EPI/HPF  FEW  

 

 UA BACTERIA (test code=XBACU)  FEW  NONE  



SOURCE OF URINE: CLEAN CATCHUR SODIUM [id number] 12:14:00





 Test Item  Value  Reference Range  Comments

 

 UR SODIUM RANDOM (test code=TERA)  16 MMOL/L    



SOURCE OF URINE: CLEAN CATCHUR CHLORIDE [id number] 12:14:00





 Test Item  Value  Reference Range  Comments

 

 UR CHLORIDE RANDOM (test code=CLU)  < 15 MMOL/L    



SOURCE OF URINE: CLEAN CATCHUR OSMOLALITY [id number] 12:14:00





 Test Item  Value  Reference Range  Comments

 

 UR OSMOLALITY RANDOM (test code=OSMOU)   MOS/KG  300-1200  



SOURCE OF URINE: CLEAN CATCHURINALYSIS [id number] 12:13:00





 Test Item  Value  Reference Range  Comments

 

 UA COLOR (test code=COLU)  RED  YELLOW  

 

 UA APPEARANCE (test code=APPU)  BLOODY  CLEAR  

 

 UA GLUCOSE DIPSTICK (test code=DGLUU)  NORMAL MG/DL  NORMAL  

 

 UA BILIRUBIN DIPSTICK (test code=BILU)  NEGATIVE MG/DL  NEGATIVE  

 

 UA KETONE DIPSTICK (test code=KETU)  NEGATIVE MG/DL  NEGATIVE  

 

 UA SPECIFIC GRAVITY (test code=SGU)  1.020  1.003-1.030  

 

 UA BLOOD DIPSTICK (test code=MELVIN)  250 Andre/mm3  NEGATIVE  

 

 UA PH DIPSTICK (test code=VALARIE)  5.0  5.0-9.0  

 

 UA PROTEIN DIPSTICK (test code=PROU)  100 MG/DL  NEGATIVE  

 

 UA UROBILINIOGEN DIPSTICK (test  NORMAL MG/DL  NORMAL  



 code=URO)      

 

 UA NITRITE DIPSTICK (test code=ANA ROSA)  NEGATIVE  NEGATIVE  

 

 UA LEUKOCYTE ESTERASE DIPSTICK (test  NEGATIVE /mm3  NEGATIVE  



 code=LEUU)      

 

 UA CULTURE NEEDED? (test code=UACULT)  NO, WBC<10 Criteria  Culture Chk  



SOURCE OF URINE: CLEAN CATCHUA [id number] 12:13:00





 Test Item  Value  Reference Range  Comments

 

 UA RBC (test code=RBCU)  >100 RBC/HPF  0-3  

 

 UA WBC (test code=XWBCU)  0-3 WBC/HPF  0-5  

 

 UA EPITHELIAL CELLS (test code=EPIU)  FEW EPI/HPF  FEW  

 

 UA BACTERIA (test code=XBACU)  FEW  NONE  



SOURCE OF URINE: CLEAN CATCHUR SODIUM [id number] 12:13:00





 Test Item  Value  Reference Range  Comments

 

 UR SODIUM RANDOM (test code=TERA)   MMOL/L    



SOURCE OF URINE: CLEAN CATCHUR CHLORIDE [id number] 12:13:00





 Test Item  Value  Reference Range  Comments

 

 UR CHLORIDE RANDOM (test code=CLU)  < 15 MMOL/L    



SOURCE OF URINE: CLEAN CATCHUR OSMOLALITY [id number] 12:13:00





 Test Item  Value  Reference Range  Comments

 

 UR OSMOLALITY RANDOM (test code=OSMOU)   MOS/KG  300-1200  



SOURCE OF URINE: CLEAN CATCHURINALYSIS [id number] 11:55:00





 Test Item  Value  Reference Range  Comments

 

 UA COLOR (test code=COLU)  RED  YELLOW  

 

 UA APPEARANCE (test code=APPU)  BLOODY  CLEAR  

 

 UA GLUCOSE DIPSTICK (test code=DGLUU)  NORMAL MG/DL  NORMAL  

 

 UA BILIRUBIN DIPSTICK (test code=BILU)  NEGATIVE MG/DL  NEGATIVE  

 

 UA KETONE DIPSTICK (test code=KETU)  NEGATIVE MG/DL  NEGATIVE  

 

 UA SPECIFIC GRAVITY (test code=SGU)  1.020  1.003-1.030  

 

 UA BLOOD DIPSTICK (test code=MELVIN)  250 Andre/mm3  NEGATIVE  

 

 UA PH DIPSTICK (test code=VALARIE)  5.0  5.0-9.0  

 

 UA PROTEIN DIPSTICK (test code=PROU)  100 MG/DL  NEGATIVE  

 

 UA UROBILINIOGEN DIPSTICK (test  NORMAL MG/DL  NORMAL  



 code=URO)      

 

 UA NITRITE DIPSTICK (test code=ANA ROSA)  NEGATIVE  NEGATIVE  

 

 UA LEUKOCYTE ESTERASE DIPSTICK (test  NEGATIVE /mm3  NEGATIVE  



 code=LEUU)      

 

 UA CULTURE NEEDED? (test code=UACULT)  NO, WBC<10 Criteria  Culture Chk  



SOURCE OF URINE: CLEAN CATCHUA [id number] 11:55:00





 Test Item  Value  Reference Range  Comments

 

 UA RBC (test code=RBCU)  >100 RBC/HPF  0-3  

 

 UA WBC (test code=XWBCU)  0-3 WBC/HPF  0-5  

 

 UA EPITHELIAL CELLS (test code=EPIU)  FEW EPI/HPF  FEW  

 

 UA BACTERIA (test code=XBACU)  FEW  NONE  



SOURCE OF URINE: CLEAN CATCHUR SODIUM [id number] 11:55:00





 Test Item  Value  Reference Range  Comments

 

 UR SODIUM RANDOM (test code=TERA)   MMOL/L    



SOURCE OF URINE: CLEAN CATCHUR CHLORIDE [id number] 11:55:00





 Test Item  Value  Reference Range  Comments

 

 UR CHLORIDE RANDOM (test code=CLU)   MMOL/L    



SOURCE OF URINE: CLEAN CATCHUR OSMOLALITY [id number] 11:55:00





 Test Item  Value  Reference Range  Comments

 

 UR OSMOLALITY RANDOM (test code=OSMOU)   MOS/KG  300-1200  



SOURCE OF URINE: CLEAN CATCHURINALYSIS [id number] 11:45:00





 Test Item  Value  Reference Range  Comments

 

 UA COLOR (test code=COLU)  RED  YELLOW  

 

 UA APPEARANCE (test code=APPU)  BLOODY  CLEAR  

 

 UA GLUCOSE DIPSTICK (test code=DGLUU)  NORMAL MG/DL  NORMAL  

 

 UA BILIRUBIN DIPSTICK (test code=BILU)  NEGATIVE MG/DL  NEGATIVE  

 

 UA KETONE DIPSTICK (test code=KETU)  NEGATIVE MG/DL  NEGATIVE  

 

 UA SPECIFIC GRAVITY (test code=SGU)  1.020  1.003-1.030  

 

 UA BLOOD DIPSTICK (test code=MELVIN)  250 Andre/mm3  NEGATIVE  

 

 UA PH DIPSTICK (test code=VALARIE)  5.0  5.0-9.0  

 

 UA PROTEIN DIPSTICK (test code=PROU)  100 MG/DL  NEGATIVE  

 

 UA UROBILINIOGEN DIPSTICK (test code=URO)  NORMAL MG/DL  NORMAL  

 

 UA NITRITE DIPSTICK (test code=ANA ROSA)  NEGATIVE  NEGATIVE  

 

 UA LEUKOCYTE ESTERASE DIPSTICK (test  NEGATIVE /mm3  NEGATIVE  



 code=LEUU)      

 

 UA CULTURE NEEDED? (test code=UACULT)   Criteria  Culture Chk  



SOURCE OF URINE: CLEAN CATCHUA [id number] 11:45:00





 Test Item  Value  Reference Range  Comments

 

 UA RBC (test code=RBCU)   RBC/HPF  0-3  

 

 UA WBC (test code=XWBCU)   WBC/HPF  0-5  

 

 UA EPITHELIAL CELLS (test code=EPIU)   EPI/HPF  FEW  

 

 UA BACTERIA (test code=XBACU)    NONE  



SOURCE OF URINE: CLEAN CATCHUR SODIUM [id number] 11:45:00





 Test Item  Value  Reference Range  Comments

 

 UR SODIUM RANDOM (test code=TERA)   MMOL/L    



SOURCE OF URINE: CLEAN CATCHUR CHLORIDE [id number] 11:45:00





 Test Item  Value  Reference Range  Comments

 

 UR CHLORIDE RANDOM (test code=CLU)   MMOL/L    



SOURCE OF URINE: CLEAN CATCHUR OSMOLALITY [id number] 11:45:00





 Test Item  Value  Reference Range  Comments

 

 UR OSMOLALITY RANDOM (test code=OSMOU)   MOS/KG  300-1200  



SOURCE OF URINE: CLEAN CATCHURINALYSIS [id number] 11:45:00





 Test Item  Value  Reference Range  Comments

 

 UA COLOR (test code=COLU)  RED  YELLOW  

 

 UA APPEARANCE (test code=APPU)  BLOODY  CLEAR  

 

 UA GLUCOSE DIPSTICK (test code=DGLUU)  NORMAL MG/DL  NORMAL  

 

 UA BILIRUBIN DIPSTICK (test code=BILU)  NEGATIVE MG/DL  NEGATIVE  

 

 UA KETONE DIPSTICK (test code=KETU)  NEGATIVE MG/DL  NEGATIVE  

 

 UA SPECIFIC GRAVITY (test code=SGU)  1.020  1.003-1.030  

 

 UA BLOOD DIPSTICK (test code=MELVIN)  250 Andre/mm3  NEGATIVE  

 

 UA PH DIPSTICK (test code=VALARIE)  5.0  5.0-9.0  

 

 UA PROTEIN DIPSTICK (test code=PROU)  100 MG/DL  NEGATIVE  

 

 UA UROBILINIOGEN DIPSTICK (test code=URO)  NORMAL MG/DL  NORMAL  

 

 UA NITRITE DIPSTICK (test code=ANA ROSA)  NEGATIVE  NEGATIVE  

 

 UA LEUKOCYTE ESTERASE DIPSTICK (test  NEGATIVE /mm3  NEGATIVE  



 code=LEUU)      

 

 UA CULTURE NEEDED? (test code=UACULT)   Criteria  Culture Chk  



SOURCE OF URINE: CLEAN CATCHUA [id number] 11:45:00





 Test Item  Value  Reference Range  Comments

 

 UA RBC (test code=RBCU)   RBC/HPF  0-3  

 

 UA WBC (test code=XWBCU)   WBC/HPF  0-5  

 

 UA EPITHELIAL CELLS (test code=EPIU)   EPI/HPF  FEW  

 

 UA BACTERIA (test code=XBACU)    NONE  



SOURCE OF URINE: CLEAN CATCHUR SODIUM [id number] 11:45:00





 Test Item  Value  Reference Range  Comments

 

 UR SODIUM RANDOM (test code=TERA)   MMOL/L    



SOURCE OF URINE: CLEAN CATCHUR CHLORIDE [id number] 11:45:00





 Test Item  Value  Reference Range  Comments

 

 UR CHLORIDE RANDOM (test code=CLU)   MMOL/L    



SOURCE OF URINE: CLEAN CATCHUR OSMOLALITY [id number] 11:45:00





 Test Item  Value  Reference Range  Comments

 

 UR OSMOLALITY RANDOM (test code=OSMOU)   MOS/KG  300-1200  



SOURCE OF URINE: CLEAN CATCHGLUCOSE BEDSIDE [id number] 11:30:00





 Test Item  Value  Reference Range  Comments

 

 GLUCOSE BEDSIDE TESTING (test code=GLUBED)  154 MG/DL  60-99  



- XR CHEST [id number] 09:18:00  Patient Name: MARCUS STERLING              
  Unit No: [id number]          EXAMS:                              CPT CODE:   
    [id number] XR CHEST 1V                                04303                 
   EXAM: Portable chest x-ray               Dictation location: T18 COMPARISON: 
Chest x-ray on 2019               INDICATION: CHF               DISCUSSION
:Mild rightward rotation is noted. Bibasilar consolidation is noted.       
There is a moderate to large right and small to moderate-sized left       
pleural effusion. There is mild cardiac silhouette enlargement. No       acute 
bony abnormalities are seen.                 IMPRESSION:         1. Bibasilar 
consolidation, either due to atelectasis, pneumonia, or         combination of 
these.         2. Moderate to large right and small to moderate-sized left 
pleural         effusions.         3. Unchanged mild cardiac silhouette 
enlargement.          ** Electronically Signed by Rick Mace MD on 2019 at 0918 **                      Reported and signed by: Rick Mace MD  
  CC: Gustavo Sosa; Sandoval Francois; Berny No MD                        
                     Technologist: La Nguyen (RT)(R)              
Transcrpt Date/Tm/Trnsp: 2019 (0918) [email or web address]                 Orig Print 
D/T: S: 2019 (4137)                              [facility]               
            NAME: MARCUS STERLING                [address]              
        PHYS: [place] [address]                
    : 1935 AGE: 84      SEX: F     ACCT NO: [id number] LOC: [id number] A 
     PHONE #: [phone]               EXAM DATE: 2019 STATUS: ADM IN  
   FAX #: [phone]               RADIOLOGY NO:            PAGE  1         
Signed ReportB-TYPE NATRIURETIC [id number] 08:31:00





 Test Item  Value  Reference Range  Comments

 

 B-TYPE NATRIURETIC PEPTIDE (test code=BNP)  2118.0 PG/ML  0-100  



GLUCOSE BEDSIDE [id number] 08:03:00





 Test Item  Value  Reference Range  Comments

 

 GLUCOSE BEDSIDE TESTING (test code=GLUBED)  147 MG/DL  60-99  



BASIC METABOLIC [id number] 06:14:00





 Test Item  Value  Reference Range  Comments

 

 SODIUM (test code=NA)  125 MMOL/L  137-145  

 

 POTASSIUM (test code=K)  4.2 MMOL/L  3.5-5.1  

 

 CHLORIDE (test code=CL)  87 MMOL/L    

 

 CARBON DIOXIDE (test code=CO2)  29 MMOL/L  22-30  

 

 ANION GAP (test code=GAP)  13 MMOL/L  14-24  

 

 GLUCOSE (test code=GLU)  152 MG/DL    

 

 BLOOD UREA NITROGEN (test  59 MG/DL  7-17  



 code=BUN)      

 

 GLOMERULAR FILTRATION RATE  22    Reporting units: ml/min/1.73



 (test code=GFR)      m2  (Modified MDRD



       Formula)Reference Range: >



       or=60 ml/min/1.73 m2

 

 CREATININE (test code=CREAT)  2.10 MG/DL  0.52-1.04  

 

 CALCIUM (test code=CA)  7.7 MG/DL  8.4-10.2  



BASIC METABOLIC [id number] 06:00:00





 Test Item  Value  Reference Range  Comments

 

 SODIUM (test code=NA)  125 MMOL/L  137-145  

 

 POTASSIUM (test code=K)  4.2 MMOL/L  3.5-5.1  

 

 CHLORIDE (test code=CL)  87 MMOL/L    

 

 CARBON DIOXIDE (test code=CO2)   MMOL/L  22-30  

 

 GLUCOSE (test code=GLU)   MG/DL    

 

 BLOOD UREA NITROGEN (test code=BUN)   MG/DL  7-17  

 

 GLOMERULAR FILTRATION RATE (test code=GFR)      

 

 CREATININE (test code=CREAT)   MG/DL  0.52-1.04  

 

 CALCIUM (test code=CA)   MG/DL  8.7-9.7  



BASIC METABOLIC [id number] 21:03:00





 Test Item  Value  Reference Range  Comments

 

 SODIUM (test code=NA)  126 MMOL/L  137-145  

 

 POTASSIUM (test code=K)  4.4 MMOL/L  3.5-5.1  

 

 CHLORIDE (test code=CL)  85 MMOL/L    

 

 CARBON DIOXIDE (test code=CO2)  30 MMOL/L  22-30  

 

 ANION GAP (test code=GAP)  15 MMOL/L  14-24  

 

 GLUCOSE (test code=GLU)  197 MG/DL    

 

 BLOOD UREA NITROGEN (test  57 MG/DL  7-17  



 code=BUN)      

 

 GLOMERULAR FILTRATION RATE  20    Reporting units: ml/min/1.73



 (test code=GFR)      m2  (Modified MDRD



       Formula)Reference Range: >



       or=60 ml/min/1.73 m2

 

 CREATININE (test code=CREAT)  2.30 MG/DL  0.52-1.04  

 

 CALCIUM (test code=CA)  7.8 MG/DL  8.4-10.2  



BASIC METABOLIC [id number] 20:59:00





 Test Item  Value  Reference Range  Comments

 

 SODIUM (test code=NA)  126 MMOL/L  137-145  

 

 POTASSIUM (test code=K)  4.4 MMOL/L  3.5-5.1  

 

 CHLORIDE (test code=CL)  85 MMOL/L    

 

 CARBON DIOXIDE (test code=CO2)   MMOL/L  22-30  

 

 GLUCOSE (test code=GLU)   MG/DL    

 

 BLOOD UREA NITROGEN (test code=BUN)   MG/DL  7-17  

 

 GLOMERULAR FILTRATION RATE (test code=GFR)      

 

 CREATININE (test code=CREAT)   MG/DL  0.52-1.04  

 

 CALCIUM (test code=CA)   MG/DL  8.7-9.7  



GLUCOSE BEDSIDE [id number] 19:51:00





 Test Item  Value  Reference Range  Comments

 

 GLUCOSE BEDSIDE TESTING (test code=GLUBED)  174 MG/DL  60-99  



- US RETROPERITONEAL [id number] 19:24:00  Patient Name: MARCUS STERLING   
             Unit No: [id number]          EXAMS:                              
CPT CODE:       [id number] US RETROPERITONEAL COM                     [id number]     
               Location code: B2               Renal Sonogram               
Indication: r/o obstruction.               Comparison: None.               
Technical factors: Long and short axis gray scale images were obtained       
with Doppler and color flow evaluation.               Findings:           Right 
kidney: The kidney measures 8.8 x 3.2 x 4.2 cm. No       hydronephrosis or 
perirenal fluid collection. Renal cortical thickness       1.1 cm is normal. 
Parenchymal echogenicity is normal. No evidence of       renal cyst or mass.   
             Left kidney: The kidney measures 8.9 x 3.9 x 3.5 cm.No 
hydronephrosis       or perirenal fluid collection. Renal cortical thickness of 
1.1 cmis       normal. Parenchymal echogenicity is normal.  A thin-walled 
homogeneous       midpole cyst measures 1.3 cm in diameter and does not require
       follow-up.               Urinary bladder: Noyola catheter in place.      
           Impression:                    1. Normal exam.  No evidence of 
obstruction                                                                    
           ** Electronically Signed by OVIDIO Vera **           **   
           on 2019 at 1924             **                    Reported and 
signed by: Preston Vera M.D.           CC: Gustavo Sosa; Berny No MD
                 Technologist: Tanvi Campbell RDMS(OB)(AB)                       
        Transcrpt Date/Tm/Trnsp: 2019 () [email or web address]                
Orig Print D/T: S: 2019 ()                    [facility]              
             NAME: MARCUS STERLING                [id number]              
        PHYS: Bern[place] [address]    : 1935 AGE: 84      SEX: F                                       ACCT NO: 
[id number] LOC: [id number]      PHONE #: [phone]               EXAM DATE:  STATUS: ADM IN     FAX #:[phone]               RADIOLOGY NO:     
       PAGE  1                       Signed ReportGLUCOSE BEDSIDE [id number]
[id number] 16:38:00





 Test Item  Value  Reference Range  Comments

 

 GLUCOSE BEDSIDE TESTING (test code=GLUBED)  175 MG/DL  60-99  



GLUCOSE BEDSIDE [id number] 07:34:00





 Test Item  Value  Reference Range  Comments

 

 GLUCOSE BEDSIDE TESTING (test code=GLUBED)  166 MG/DL  60-99  



GLUCOSE BEDSIDE [id number] 16:10:00





 Test Item  Value  Reference Range  Comments

 

 GLUCOSE BEDSIDE TESTING (test code=GLUBED)  156 MG/DL  60-99  



GLUCOSE BEDSIDE [id number] 12:15:00





 Test Item  Value  Reference Range  Comments

 

 GLUCOSE BEDSIDE TESTING (test code=GLUBED)  200 MG/DL  60-99  



GLUCOSE BEDSIDE [id number] 07:55:00





 Test Item  Value  Reference Range  Comments

 

 GLUCOSE BEDSIDE TESTING (test code=GLUBED)  140 MG/DL  60-99  



BASIC METABOLIC [id number] 07:06:00





 Test Item  Value  Reference Range  Comments

 

 SODIUM (test code=NA)  132 MMOL/L  137-145  

 

 POTASSIUM (test code=K)  3.8 MMOL/L  3.5-5.1  

 

 CHLORIDE (test code=CL)  89 MMOL/L    

 

 CARBON DIOXIDE (test code=CO2)  33 MMOL/L  22-30  

 

 GLUCOSE (test code=GLU)  139 MG/DL    

 

 BLOOD UREA NITROGEN (test  52 MG/DL  7-17  



 code=BUN)      

 

 GLOMERULAR FILTRATION RATE  27    Reporting units: ml/min/1.73



 (test code=GFR)      m2  (Modified MDRD



       Formula)Reference Range: >



       or=60 ml/min/1.73 m2

 

 CREATININE (test code=CREAT)  1.80 MG/DL  0.52-1.04  

 

 CALCIUM (test code=CA)  7.7 MG/DL  8.4-10.2  



BASIC METABOLIC [id number] 07:05:00





 Test Item  Value  Reference Range  Comments

 

 SODIUM (test code=NA)  132 MMOL/L  137-145  

 

 POTASSIUM (test code=K)  3.8 MMOL/L  3.5-5.1  

 

 CHLORIDE (test code=CL)  89 MMOL/L    

 

 CARBON DIOXIDE (test code=CO2)   MMOL/L  22-30  

 

 GLUCOSE (test code=GLU)   MG/DL    

 

 BLOOD UREA NITROGEN (test   MG/DL  7-17  



 code=BUN)      

 

 GLOMERULAR FILTRATION RATE  27    Reporting units: ml/min/1.73



 (test code=GFR)      m2  (Modified MDRD



       Formula)Reference Range: >



       or=60 ml/min/1.73 m2

 

 CREATININE (test code=CREAT)  1.80 MG/DL  0.52-1.04  

 

 CALCIUM (test code=CA)   MG/DL  8.7-9.7  



BASIC METABOLIC [id number] 07:05:00





 Test Item  Value  Reference Range  Comments

 

 SODIUM (test code=NA)  132 MMOL/L  137-145  

 

 POTASSIUM (test code=K)  3.8 MMOL/L  3.5-5.1  

 

 CHLORIDE (test code=CL)  89 MMOL/L    

 

 CARBON DIOXIDE (test code=CO2)  33 MMOL/L  22-30  

 

 GLUCOSE (test code=GLU)   MG/DL    

 

 BLOOD UREA NITROGEN (test  52 MG/DL  7-17  



 code=BUN)      

 

 GLOMERULAR FILTRATION RATE  27    Reporting units: ml/min/1.73



 (test code=GFR)      m2  (Modified MDRD



       Formula)Reference Range: >



       or=60 ml/min/1.73 m2

 

 CREATININE (test code=CREAT)  1.80 MG/DL  0.52-1.04  

 

 CALCIUM (test code=CA)   MG/DL  8.7-9.7  



BASIC METABOLIC [id number] 07:02:00





 Test Item  Value  Reference Range  Comments

 

 SODIUM (test code=NA)  132 MMOL/L  137-145  

 

 POTASSIUM (test code=K)  3.8 MMOL/L  3.5-5.1  

 

 CHLORIDE (test code=CL)  89 MMOL/L    

 

 CARBON DIOXIDE (test code=CO2)   MMOL/L  22-30  

 

 GLUCOSE (test code=GLU)   MG/DL    

 

 BLOOD UREA NITROGEN (test code=BUN)   MG/DL  7-17  

 

 GLOMERULAR FILTRATION RATE (test code=GFR)      

 

 CREATININE (test code=CREAT)   MG/DL  0.52-1.04  

 

 CALCIUM (test code=CA)   MG/DL  8.7-9.7  



GLUCOSE BEDSIDE [id number] 20:56:00





 Test Item  Value  Reference Range  Comments

 

 GLUCOSE BEDSIDE TESTING (test code=GLUBED)  155 MG/DL  60-99  



GLUCOSE BEDSIDE [id number] 16:03:00





 Test Item  Value  Reference Range  Comments

 

 GLUCOSE BEDSIDE TESTING (test code=GLUBED)  151 MG/DL  60-99  



GLUCOSE BEDSIDE [id number] 12:06:00





 Test Item  Value  Reference Range  Comments

 

 GLUCOSE BEDSIDE TESTING (test code=GLUBED)  152 MG/DL  60-99  



GLUCOSE BEDSIDE [id number] 07:45:00





 Test Item  Value  Reference Range  Comments

 

 GLUCOSE BEDSIDE TESTING (test code=GLUBED)  149 MG/DL  60-99  



BASIC METABOLIC [id number] 06:27:00





 Test Item  Value  Reference Range  Comments

 

 SODIUM (test code=NA)  132 MMOL/L  137-145  

 

 POTASSIUM (test code=K)  3.7 MMOL/L  3.5-5.1  

 

 CHLORIDE (test code=CL)  89 MMOL/L    

 

 CARBON DIOXIDE (test code=CO2)  33 MMOL/L  22-30  

 

 GLUCOSE (test code=GLU)  145 MG/DL    

 

 BLOOD UREA NITROGEN (test  50 MG/DL  717  



 code=BUN)      

 

 GLOMERULAR FILTRATION RATE  24    Reporting units: ml/min/1.73



 (test code=GFR)      m2  (Modified MDRD



       Formula)Reference Range: >



       or=60 ml/min/1.73 m2

 

 CREATININE (test code=CREAT)  2.00 MG/DL  0.52-1.04  

 

 CALCIUM (test code=CA)  7.6 MG/DL  8.4-10.2  



BASIC METABOLIC [id number] 06:26:00





 Test Item  Value  Reference Range  Comments

 

 SODIUM (test code=NA)  132 MMOL/L  137-145  

 

 POTASSIUM (test code=K)  3.7 MMOL/L  3.5-5.1  

 

 CHLORIDE (test code=CL)  89 MMOL/L    

 

 CARBON DIOXIDE (test code=CO2)   MMOL/L  22-30  

 

 GLUCOSE (test code=GLU)   MG/DL    

 

 BLOOD UREA NITROGEN (test   MG/DL  717  



 code=BUN)      

 

 GLOMERULAR FILTRATION RATE  24    Reporting units: ml/min/1.73



 (test code=GFR)      m2  (Modified MDRD



       Formula)Reference Range: >



       or=60 ml/min/1.73 m2

 

 CREATININE (test code=CREAT)  2.00 MG/DL  0.52-1.04  

 

 CALCIUM (test code=CA)   MG/DL  8.7-9.7  



BASIC METABOLIC [id number] 06:24:00





 Test Item  Value  Reference Range  Comments

 

 SODIUM (test code=NA)  132 MMOL/L  137-145  

 

 POTASSIUM (test code=K)  3.7 MMOL/L  3.5-5.1  

 

 CHLORIDE (test code=CL)  89 MMOL/L    

 

 CARBON DIOXIDE (test code=CO2)   MMOL/L  22-30  

 

 GLUCOSE (test code=GLU)   MG/DL    

 

 BLOOD UREA NITROGEN (test code=BUN)   MG/DL  7-17  

 

 GLOMERULAR FILTRATION RATE (test code=GFR)      

 

 CREATININE (test code=CREAT)   MG/DL  0.52-1.04  

 

 CALCIUM (test code=CA)   MG/DL  8.7-9.7  



BASIC METABOLIC [id number] 06:23:00





 Test Item  Value  Reference Range  Comments

 

 SODIUM (test code=NA)  132 MMOL/L  137-145  

 

 POTASSIUM (test code=K)   MMOL/L  3.5-5.1  

 

 CHLORIDE (test code=CL)  89 MMOL/L    

 

 CARBON DIOXIDE (test code=CO2)   MMOL/L  22-30  

 

 GLUCOSE (test code=GLU)   MG/DL    

 

 BLOOD UREA NITROGEN (test code=BUN)   MG/DL  7-17  

 

 GLOMERULAR FILTRATION RATE (test code=GFR)      

 

 CREATININE (test code=CREAT)   MG/DL  0.52-1.04  

 

 CALCIUM (test code=CA)   MG/DL  8.7-9.7  



GLUCOSE BEDSIDE [id number] 20:01:00





 Test Item  Value  Reference Range  Comments

 

 GLUCOSE BEDSIDE TESTING (test code=GLUBED)  165 MG/DL  60-99  



GLUCOSE BEDSIDE [id number] 16:26:00





 Test Item  Value  Reference Range  Comments

 

 GLUCOSE BEDSIDE TESTING (test code=GLUBED)  155 MG/DL  60-99  



GLUCOSE BEDSIDE [id number] 08:10:00





 Test Item  Value  Reference Range  Comments

 

 GLUCOSE BEDSIDE TESTING (test code=GLUBED)  157 MG/DL  60-99  



BASIC METABOLIC [id number] 06:59:00





 Test Item  Value  Reference Range  Comments

 

 SODIUM (test code=NA)  132 MMOL/L  137-145  

 

 POTASSIUM (test code=K)  3.6 MMOL/L  3.5-5.1  

 

 CHLORIDE (test code=CL)  91 MMOL/L    

 

 CARBON DIOXIDE (test code=CO2)  30 MMOL/L  22-30  

 

 GLUCOSE (test code=GLU)  134 MG/DL    

 

 BLOOD UREA NITROGEN (test  52 MG/DL  7-17  



 code=BUN)      

 

 GLOMERULAR FILTRATION RATE  21    Reporting units: ml/min/1.73



 (test code=GFR)      m2  (Modified MDRD



       Formula)Reference Range: >



       or=60 ml/min/1.73 m2

 

 CREATININE (test code=CREAT)  2.20 MG/DL  0.52-1.04  

 

 CALCIUM (test code=CA)  7.3 MG/DL  8.4-10.2  



BASIC METABOLIC [id number] 06:58:00





 Test Item  Value  Reference Range  Comments

 

 SODIUM (test code=NA)  132 MMOL/L  137-145  

 

 POTASSIUM (test code=K)  3.6 MMOL/L  3.5-5.1  

 

 CHLORIDE (test code=CL)  91 MMOL/L    

 

 CARBON DIOXIDE (test code=CO2)  30 MMOL/L  22-30  

 

 GLUCOSE (test code=GLU)  134 MG/DL    

 

 BLOOD UREA NITROGEN (test  52 MG/DL  7-17  



 code=BUN)      

 

 GLOMERULAR FILTRATION RATE  21    Reporting units: ml/min/1.73



 (test code=GFR)      m2  (Modified MDRD



       Formula)Reference Range: >



       or=60 ml/min/1.73 m2

 

 CREATININE (test code=CREAT)  2.20 MG/DL  0.52-1.04  

 

 CALCIUM (test code=CA)   MG/DL  8.7-9.7  



GLUCOSE BEDSIDE [id number] 20:22:00





 Test Item  Value  Reference Range  Comments

 

 GLUCOSE BEDSIDE TESTING (test code=GLUBED)  127 MG/DL  60-99  



GLUCOSE BEDSIDE [id number] 17:37:00





 Test Item  Value  Reference Range  Comments

 

 GLUCOSE BEDSIDE TESTING (test code=GLUBED)  123 MG/DL  60-99  



GLUCOSE BEDSIDE [id number] 12:16:00





 Test Item  Value  Reference Range  Comments

 

 GLUCOSE BEDSIDE TESTING (test code=GLUBED)  162 MG/DL  60-99  



CBC W/AUTO [id number] 11:31:00





 Test Item  Value  Reference Range  Comments

 

 WHITE BLOOD CELL (test code=WBC)  8.9 K/MM3  3.8-9.8  

 

 RED BLOOD CELL (test code=RBC)  3.91 M/MM3  3.58-4.97  

 

 HEMOGLOBIN (test code=HGB)  10.4 G/DL  11.2-14.9  

 

 HEMATOCRIT (test code=HCT)  33.3 %  33.2-43.5  

 

 MEAN CELL VOLUME (test code=MCV)  85 fL  80.7-99.1  

 

 MEAN CELL HGB (test code=MCH)  26.6 pg  27.0-34.1  

 

 MEAN CELL HGB CONCETRATION (test code=MCHC)  31.2 %  32.2-35.7  

 

 RED CELL DISTRIBUTION WIDTH (test code=RDW)  17.1 %  12.1-15.2  

 

 PLATELET COUNT (test code=PLT)  287 K/MM3  129-368  

 

 MEAN PLATELET VOLUME (test code=MPV)  9.7 fl  7.4-10.4  

 

 NEUTROPHIL % (test code=NT%)  80.7 %  43-75  

 

 IMMATURE GRANULOCYTE % (test code=IG%)  0.6 %  0.0-2.0  

 

 LYMPHOCYTE % (test code=LY%)  10.7 %  14-44  

 

 MONOCYTE % (test code=MO%)  7.9 %  4-13  

 

 EOSINOPHIL % (test code=EO%)  0.1 %  0-6  

 

 BASOPHIL % (test code=BA%)  0 %  0-2  

 

 NUCLEATED RBC % (test code=NRBC%)  1.4 %  0-1.0  

 

 NEUTROPHIL # (test code=NT#)  7.16 K/mm3  2.0-7.6  

 

 IMMATURE GRANULOCYTE # (test code=IG#)  0.05 x10 3/uL  0-0.03  

 

 LYMPHOCYTE # (test code=LY#)  0.95 K/mm3  1.0-3.8  

 

 MONOCYTE # (test code=MO#)  0.70 K/mm3  0.1-0.8  

 

 EOSINOPHIL # (test code=EO#)  0.01 K/mm3  0.0-0.2  

 

 BASOPHIL # (test code=BA#)  0 K/mm3  0.0-0.2  

 

 NUCLEATED RBC # (test code=NRBC#)  0.12 K/mm3  0.0-0.1  



DIFFERENTIAL [id number] 11:31:00





 Test Item  Value  Reference Range  Comments

 

 RBC MORPHOLOGY REQUIRED (test code=RBCM)  ABNORMAL    

 

 POIKILOCYTOSIS (test code=POIK)  MANY  NONE  

 

 ANISOCYTOSIS (test code=ANISO)  SLIGHT  NONE  

 

 MACROCYTOSIS (test code=MACR)  FEW  NONE  

 

 CRENATED CELLS (test code=CREN)  MODERATE  NONE  

 

 PLATELET ESTIMATE (test code=PLTEST)  ADEQUATE  ADEQUATE  

 

 PLATELET MORPHOLOGY (test code=PLTMORPH)  NORMAL  NORMAL  



GLUCOSE BEDSIDE [id number] 08:29:00





 Test Item  Value  Reference Range  Comments

 

 GLUCOSE BEDSIDE TESTING (test code=GLUBED)  145 MG/DL  60-99  



BASIC METABOLIC [id number] 07:23:00





 Test Item  Value  Reference Range  Comments

 

 SODIUM (test code=NA)  135 MMOL/L  137-145  

 

 POTASSIUM (test code=K)  3.9 MMOL/L  3.5-5.1  

 

 CHLORIDE (test code=CL)  101 MMOL/L    

 

 CARBON DIOXIDE (test code=CO2)  17 MMOL/L  22-30  

 

 GLUCOSE (test code=GLU)  132 MG/DL    

 

 BLOOD UREA NITROGEN (test  47 MG/DL  7-17  



 code=BUN)      

 

 GLOMERULAR FILTRATION RATE  20    Reporting units: ml/min/1.73



 (test code=GFR)      m2  (Modified MDRD



       Formula)Reference Range: >



       or=60 ml/min/1.73 m2

 

 CREATININE (test code=CREAT)  2.30 MG/DL  0.52-1.04  

 

 CALCIUM (test code=CA)  7.6 MG/DL  8.4-10.2  



BASIC METABOLIC [id number] 06:57:00





 Test Item  Value  Reference Range  Comments

 

 SODIUM (test code=NA)  135 MMOL/L  137-145  

 

 POTASSIUM (test code=K)  3.9 MMOL/L  3.5-5.1  

 

 CHLORIDE (test code=CL)  101 MMOL/L    

 

 CARBON DIOXIDE (test code=CO2)  17 MMOL/L  22-30  

 

 GLUCOSE (test code=GLU)   MG/DL    

 

 BLOOD UREA NITROGEN (test   MG/DL  7-17  



 code=BUN)      

 

 GLOMERULAR FILTRATION RATE  20    Reporting units: ml/min/1.73



 (test code=GFR)      m2  (Modified MDRD



       Formula)Reference Range: >



       or=60 ml/min/1.73 m2

 

 CREATININE (test code=CREAT)  2.30 MG/DL  0.52-1.04  

 

 CALCIUM (test code=CA)   MG/DL  8.7-9.7  



BASIC METABOLIC [id number] 06:55:00





 Test Item  Value  Reference Range  Comments

 

 SODIUM (test code=NA)  135 MMOL/L  137-145  

 

 POTASSIUM (test code=K)  3.9 MMOL/L  3.5-5.1  

 

 CHLORIDE (test code=CL)  101 MMOL/L    

 

 CARBON DIOXIDE (test code=CO2)   MMOL/L  22-30  

 

 GLUCOSE (test code=GLU)   MG/DL    

 

 BLOOD UREA NITROGEN (test code=BUN)   MG/DL  7-17  

 

 GLOMERULAR FILTRATION RATE (test code=GFR)      

 

 CREATININE (test code=CREAT)   MG/DL  0.52-1.04  

 

 CALCIUM (test code=CA)   MG/DL  8.7-9.7  



BASIC METABOLIC [id number] 06:54:00





 Test Item  Value  Reference Range  Comments

 

 SODIUM (test code=NA)   MMOL/L  137-145  

 

 POTASSIUM (test code=K)   MMOL/L  3.5-5.1  

 

 CHLORIDE (test code=CL)  101 MMOL/L    

 

 CARBON DIOXIDE (test code=CO2)   MMOL/L  22-30  

 

 GLUCOSE (test code=GLU)   MG/DL    

 

 BLOOD UREA NITROGEN (test code=BUN)   MG/DL  7-17  

 

 GLOMERULAR FILTRATION RATE (test code=GFR)      

 

 CREATININE (test code=CREAT)   MG/DL  0.52-1.04  

 

 CALCIUM (test code=CA)   MG/DL  8.7-9.7  



CBC W/AUTO [id number] 06:26:00





 Test Item  Value  Reference Range  Comments

 

 WHITE BLOOD CELL (test code=WBC)  8.9 K/MM3  3.8-9.8  

 

 RED BLOOD CELL (test code=RBC)  3.91 M/MM3  3.58-4.97  

 

 HEMOGLOBIN (test code=HGB)  10.4 G/DL  11.2-14.9  

 

 HEMATOCRIT (test code=HCT)  33.3 %  33.2-43.5  

 

 MEAN CELL VOLUME (test code=MCV)  85 fL  80.7-99.1  

 

 MEAN CELL HGB (test code=MCH)  26.6 pg  27.0-34.1  

 

 MEAN CELL HGB CONCETRATION (test code=MCHC)  31.2 %  32.2-35.7  

 

 RED CELL DISTRIBUTION WIDTH (test code=RDW)  17.1 %  12.1-15.2  

 

 PLATELET COUNT (test code=PLT)  287 K/MM3  129-368  

 

 MEAN PLATELET VOLUME (test code=MPV)  9.7 fl  7.4-10.4  

 

 NEUTROPHIL % (test code=NT%)  80.7 %  43-75  

 

 IMMATURE GRANULOCYTE % (test code=IG%)  0.6 %  0.0-2.0  

 

 LYMPHOCYTE % (test code=LY%)  10.7 %  14-44  

 

 MONOCYTE % (test code=MO%)  7.9 %  4-13  

 

 EOSINOPHIL % (test code=EO%)  0.1 %  0-6  

 

 BASOPHIL % (test code=BA%)  0 %  0-2  

 

 NUCLEATED RBC % (test code=NRBC%)  1.4 %  0-1.0  

 

 NEUTROPHIL # (test code=NT#)  7.16 K/mm3  2.0-7.6  

 

 IMMATURE GRANULOCYTE # (test code=IG#)  0.05 x10 3/uL  0-0.03  

 

 LYMPHOCYTE # (test code=LY#)  0.95 K/mm3  1.0-3.8  

 

 MONOCYTE # (test code=MO#)  0.70 K/mm3  0.1-0.8  

 

 EOSINOPHIL # (test code=EO#)  0.01 K/mm3  0.0-0.2  

 

 BASOPHIL # (test code=BA#)  0 K/mm3  0.0-0.2  

 

 NUCLEATED RBC # (test code=NRBC#)  0.12 K/mm3  0.0-0.1  



DIFFERENTIAL [id number] 06:26:00





 Test Item  Value  Reference Range  Comments

 

 RBC MORPHOLOGY REQUIRED (test code=RBCM)      

 

 PLATELET ESTIMATE (test code=PLTEST)    ADEQUATE  

 

 PLATELET MORPHOLOGY (test code=PLTMORPH)    NORMAL  



CBC W/AUTO [id number] 06:26:00





 Test Item  Value  Reference Range  Comments

 

 WHITE BLOOD CELL (test code=WBC)  8.9 K/MM3  3.8-9.8  

 

 RED BLOOD CELL (test code=RBC)  3.91 M/MM3  3.58-4.97  

 

 HEMOGLOBIN (test code=HGB)  10.4 G/DL  11.2-14.9  

 

 HEMATOCRIT (test code=HCT)  33.3 %  33.2-43.5  

 

 MEAN CELL VOLUME (test code=MCV)  85 fL  80.7-99.1  

 

 MEAN CELL HGB (test code=MCH)  26.6 pg  27.0-34.1  

 

 MEAN CELL HGB CONCETRATION (test code=MCHC)  31.2 %  32.2-35.7  

 

 RED CELL DISTRIBUTION WIDTH (test code=RDW)  17.1 %  12.1-15.2  

 

 PLATELET COUNT (test code=PLT)  287 K/MM3  129-368  

 

 MEAN PLATELET VOLUME (test code=MPV)  9.7 fl  7.4-10.4  

 

 NEUTROPHIL % (test code=NT%)  80.7 %  43-75  

 

 IMMATURE GRANULOCYTE % (test code=IG%)  0.6 %  0.0-2.0  

 

 LYMPHOCYTE % (test code=LY%)  10.7 %  14-44  

 

 MONOCYTE % (test code=MO%)  7.9 %  4-13  

 

 EOSINOPHIL % (test code=EO%)  0.1 %  0-6  

 

 BASOPHIL % (test code=BA%)  0 %  0-2  

 

 NUCLEATED RBC % (test code=NRBC%)  1.4 %  0-1.0  

 

 NEUTROPHIL # (test code=NT#)  7.16 K/mm3  2.0-7.6  

 

 IMMATURE GRANULOCYTE # (test code=IG#)  0.05 x10 3/uL  0-0.03  

 

 LYMPHOCYTE # (test code=LY#)  0.95 K/mm3  1.0-3.8  

 

 MONOCYTE # (test code=MO#)  0.70 K/mm3  0.1-0.8  

 

 EOSINOPHIL # (test code=EO#)  0.01 K/mm3  0.0-0.2  

 

 BASOPHIL # (test code=BA#)  0 K/mm3  0.0-0.2  

 

 NUCLEATED RBC # (test code=NRBC#)  0.12 K/mm3  0.0-0.1  



DIFFERENTIAL [id number] 06:26:00





 Test Item  Value  Reference Range  Comments

 

 RBC MORPHOLOGY REQUIRED (test code=RBCM)      

 

 PLATELET ESTIMATE (test code=PLTEST)    ADEQUATE  

 

 PLATELET MORPHOLOGY (test code=PLTMORPH)    NORMAL  



GLUCOSE BEDSIDE [id number] 17:48:00





 Test Item  Value  Reference Range  Comments

 

 GLUCOSE BEDSIDE TESTING (test code=GLUBED)  130 MG/DL  60-99  



- MRI BRAIN W/O [id number] 15:34:00  Patient Name: MARCUS STERLING   
             Unit No: [id number]          EXAMS:                              
CPT CODE:       [id number] MRI BRAIN W/O CONTRAST                     00221     
               EXAM:  - MRI BRAIN W/O CONTRAST               LOCATION: H57     
          HISTORY: 84 years-year old Female with RULE OUT STROKE               
TECHNIQUE: Multiplanar multisequence MR images of the brain were       obtained 
without intravenous contrast.               COMPARISON: Same day CT head       
        FINDINGS:               Restricted diffusion is seen in the right 
posterior parafalcine       frontal lobe.  Right cerebellar encephalomalacia. 
There is no mass,       mass effect or abnormal extra-axial fluid collection.  
               The ventricles are normal in size,shape, and position.          
       There are normal signal voids in the larger intracranial vessels.       
        The paranasal sinuses and mastoid air cells are predominantly clear.   
     The marrow signal pattern is within normal limits.                 
IMPRESSION:                   Acute left CATHERINE territory infarct.  No acute 
intracranial hemorrhage.                    ** Electronically Signed by Feliciano Graham on 2019 at 1534 **                      Reported and signed by: 
Feliciano Graham                   CC: Gustavo Sosa; Giovanny Rivera MD; 
Berny No MD                                                            
Technologist: Nava Vick, RT(R)                             Transcrpt 
Date/Tm/Trnsp: 2019 (1534) t.[email or web address].[email or web address]                Orig Print D/T: S:  (3794)                              [facility]                         
  NAME: MARCUS STERLING                [address]                      
PHYS: [place] [address]                    : 
1935 AGE: 84      SEX: F                    ACCT NO: [id number] [facility]      PHONE #: [phone]               EXAMDATE: 2019 STATUS: 
ADM IN     FAX #: [phone]               RADIOLOGY NO:            PAGE1    
                   Signed ReportGLUCOSE BEDSIDE [id number] 12:19:00





 Test Item  Value  Reference Range  Comments

 

 GLUCOSE BEDSIDE TESTING (test code=GLUBED)  146 MG/DL  60-99  



LIPOPROTEIN LDL [id number] 11:15:00





 Test Item  Value  Reference Range  Comments

 

 LIPOPROTEIN LDL DIRECT (test  45 mg/dL  100-129  ============================



 code=LDLDIR)      ============================



       ===Reference Interval:



          mg/dL



       mmol/L----------------------



       ----------------------------



       ---------Optimal



                <100



       <2.6Near/above optimal



          100-129



       2.6-3.3Borderline High



             130-159



       3.4-4.1High



             160-189



       4.1-4.9Very High



              &gt;=190



       >=4.9=========This LDL



       result is a direct



       measurement.=========



[id number] 11:15:00





 Test Item  Value  Reference Range  Comments

 

 TROPONIN-I (test  1.610 NG/ML  0.012-0.033  CALLED TO KIERAN MICHAELS & READBACK



 code=TROPI)      ON 19 AT [facility]



       Lorena Wan



LIPOPROTEIN LDL [id number] 10:39:00





 Test Item  Value  Reference Range  Comments

 

 LIPOPROTEIN LDL DIRECT (test code=LDLDIR)   mg/dL  100-129  



[id number] 10:39:00





 Test Item  Value  Reference Range  Comments

 

 TROPONIN-I (test  1.610 NG/ML  0.012-0.033  CALLED TO KIERAN MICHAELS & READBACK



 code=TROPI)      ON 19 AT [facility]



       Lorena Wan



PROTHROMBIN [id number] 10:39:00





 Test Item  Value  Reference Range  Comments

 

 PROTHROMBIN TIME PATIENT (test  14.0 SECONDS  9.6-11.6  



 code=PTP)      

 

 INTERNATIONAL NORMAL RATIO  1.3  0.8-1.1  The INR is to be used only



 (test code=INR)      for monitoring oral



       anticoagulanttherapy.



       INDICATION



       



       INR



       VALUE---------------------



       --------------------------



       ------------1.



       Prophylaxis, deep venous



       thrombosis,    including



       high risk surgery.



                2.0 - 3.0 2.



       Prophylaxis, deep venous



       thrombosis,    hip



       surgery, treatment for



       deep venous    thrombosis



       or pulmonary prevention of



          systemic embolism in



       patients with    valvular



       heart disease, atrial



       fibrillation,    tissue



       heart valve, or acute



       myocardial    infarction.



       



            2.0 - 3.0 3.



       Mechanical prosthesis



       heart valves,    recurrent



       systemic embolism.



               3.0 - 4.5



- CT HEAD/BRAIN W/O [id number] 10:28:00 Patient Name: MARCUS STERLING    
          Unit No: [id number]         EXAMS:                          CPT CODE:
       [id number] CT HEAD/BRAIN W/O CONT                     [address]13              
     Noncontrast CT of the brain               LOCATION:  B2               
Clinical indication:Code stroke               Comparison: None               
Axial, coronal and sagittal series are provided from the skull base to       
vertex without intravenous contrast.  One or more the following dose       
reduction techniques were used: Automated exposure control, adjustment       of 
mA and/or kV according to patient size, and use of iterative       
reconstruction technique.       737.12 mGy-cm.                       
Encephalomalacia is demonstrated in the left parietal parafalcinecortex which 
likely represents subacute infarct.  There is also large       right chronic 
appearing cerebellar infarct.  There is no evidence of       mass, mass effect, 
hemorrhage, hydrocephalus, extra-axial collection       or calvarial 
abnormality.  Visualized portions of the paranasal       sinuses are well 
aerated.                 Impression:                              1.  Subacute 
infarct in the left anterior cerebral artery territory         for which 
further with MRI is recommended.                     2.  Chronic right 
cerebellar infarct.                             3.  Findings called and 
discussed with Dr. Juvenal Andrade at 10:20 AM         on 2019             
      *******************FOR INTERNAL CODING PURPOSES ONLY****************     
    RESULT CODE: CVR     ** Electronically Signed by Alyson Mayen MD **  
         **              on 2019 [id number]             **                   
   Reported and signed by: Alyson Mayen MD            CC: Gustavo Sosa; Juvenal Andrade MD; Berny No MD                                       
Technologist: GALINA CRAIG RT(R)(CT)(MR);  CTDI:        DLP:         Trnscrpt
: 2019 (1028) t.SDR.KRS6                             [facility]           
NAME: MARCUS STERLING                [address]                      PHYS
: [email or web address].Emy- [place] [address]                    :  AGE: 84      SEX: F                                 ACCT NO: [id number] 
LOC: Z.354 A      PHONE #: [phone]        EXAM DATE: 2019 STATUS: 
ADM IN     FAX #: [phone]               RAD #: D/C DT                PAGE  
1                       Signed Report                                 Patient 
Name: MARCUS STERLING              Unit No: [id number]         EXAMS:        
               CPT CODE:       [id number] CT HEAD/BRAIN W/O CONT                
     [address]            &lt;Continued&gt;  Orig Print D/T: S: 2019 (1031)  
                                    [facility]                           NAME: 
MARCUS STERLING            [address]                      PHYS: [place] [address]                    : 1935 AGE
: 84      SEX: F                                       ACCTNO: [id number] LOC
: Z.354 A      PHONE #: [phone]               EXAM DATE: 2019 STATUS:
ADM IN     FAX #: [phone]               RAD #:             D/C DT         
       PAGE  2               Signed [id number] 10:24:00





 Test Item  Value  Reference Range  Comments

 

 GLUCOSE (test code=GLU)  140 MG/DL    



- XR CHEST [id number] 09:46:00  Patient Name: MARCUS STERLING              
  Unit No: [id number]          EXAMS:                              CPT CODE:   
    [id number] XR CHEST 1V                                14818                 
   Location code: B2               Chest 1 view               Indication: 
Hypoxia.               Comparison: None               Findings:               
Mild cardiomegaly. Pulmonary vascular congestion. No pleural effusion.       
Mild thoracic spondylosis.                 IMPRESSION:                   1. 
Cardiomegaly.         2. Mild pulmonary vascular congestion.** Electronically 
Signed by OVIDIO Vera **           **              on 2019 at 
0946            **                      Reported and signed by: Preston Vera M.D.          CC: Gustavo Sosa; Sandoval Francois; Berny No MD          
                                         Technologist: Nava Lundy, BSRS, RT(R
)                    Transcrpt Date/Tm/Trnsp: 2019 (0946) [id number].[id number]     
           Orig Print D/T: S: 2019 (0949)                              
[facility]                           NAME: MARCUS STERLING                
[address]                      PHYS: [place] [address]                    : 1935 AGE: 84      SEX: F           ACCT 
NO: [id number] LOC: Z.354 A      PHONE #: [phone]               EXAM DATE
: 2019 STATUS: ADM IN     FAX #: [phone]               RADIOLOGY NO:
            PAGE  1               Signed ReportCBC W/AUTO [id number] 08:33:
00





 Test Item  Value  Reference Range  Comments

 

 WHITE BLOOD CELL (test code=WBC)  7.4 K/MM3  3.8-9.8  

 

 RED BLOOD CELL (test code=RBC)  3.77 M/MM3  3.58-4.97  

 

 HEMOGLOBIN (test code=HGB)  9.9 G/DL  11.2-14.9  

 

 HEMATOCRIT (test code=HCT)  32.6 %  33.2-43.5  

 

 MEAN CELL VOLUME (test code=MCV)  87 fL  80.7-99.1  

 

 MEAN CELL HGB (test code=MCH)  26.3 pg  27.0-34.1  

 

 MEAN CELL HGB CONCETRATION (test code=MCHC)  30.4 %  32.2-35.7  

 

 RED CELL DISTRIBUTION WIDTH (test code=RDW)  17.2 %  12.1-15.2  

 

 PLATELET COUNT (test code=PLT)  321 K/MM3  129-368  

 

 MEAN PLATELET VOLUME (test code=MPV)  9.8 fl  7.4-10.4  

 

 NEUTROPHIL % (test code=NT%)  86.9 %  43-75  

 

 IMMATURE GRANULOCYTE % (test code=IG%)  0.5 %  0.0-2.0  

 

 LYMPHOCYTE % (test code=LY%)  8.4 %  14-44  

 

 MONOCYTE % (test code=MO%)  4.1 %  4-13  

 

 EOSINOPHIL % (test code=EO%)  0.0 %  0-6  

 

 BASOPHIL % (test code=BA%)  0.1 %  0-2  

 

 NUCLEATED RBC % (test code=NRBC%)  3.0 %  0-1.0  

 

 NEUTROPHIL # (test code=NT#)  6.42 K/mm3  2.0-7.6  

 

 IMMATURE GRANULOCYTE # (test code=IG#)  0.04 x10 3/uL  0-0.03  

 

 LYMPHOCYTE # (test code=LY#)  0.62 K/mm3  1.0-3.8  

 

 MONOCYTE # (test code=MO#)  0.30 K/mm3  0.1-0.8  

 

 EOSINOPHIL # (test code=EO#)  0.00 K/mm3  0.0-0.2  

 

 BASOPHIL # (test code=BA#)  0.01 K/mm3  0.0-0.2  

 

 NUCLEATED RBC # (test code=NRBC#)  0.22 K/mm3  0.0-0.1  

 

 NUCLEATED RED BLOOD CELL (test code=NRBC)  3  0-0  



DIFFERENTIAL [id number] 08:33:00





 Test Item  Value  Reference Range  Comments

 

 RBC MORPHOLOGY REQUIRED (test code=RBCM)  ABNORMAL    

 

 POIKILOCYTOSIS (test code=POIK)  MODERATE  NONE  

 

 MARIANO CELLS (test code=MARIANO)  MODERATE  NONE  

 

 OVALOCYTES (test code=OVAL)  FEW  NONE  

 

 PLATELET ESTIMATE (test code=PLTEST)  ADEQUATE  ADEQUATE  

 

 PLATELET MORPHOLOGY (test code=PLTMORPH)  NORMAL  NORMAL  



B-TYPE NATRIURETIC [id number] 08:33:00





 Test Item  Value  Reference Range  Comments

 

 B-TYPE NATRIURETIC PEPTIDE (test code=BNP)  3926.0 PG/ML  0-100  



CBC W/AUTO [id number] 07:30:00





 Test Item  Value  Reference Range  Comments

 

 WHITE BLOOD CELL (test code=WBC)  7.4 K/MM3  3.8-9.8  

 

 RED BLOOD CELL (test code=RBC)  3.77 M/MM3  3.58-4.97  

 

 HEMOGLOBIN (test code=HGB)  9.9 G/DL  11.2-14.9  

 

 HEMATOCRIT (test code=HCT)  32.6 %  33.2-43.5  

 

 MEAN CELL VOLUME (test code=MCV)  87 fL  80.7-99.1  

 

 MEAN CELL HGB (test code=MCH)  26.3 pg  27.0-34.1  

 

 MEAN CELL HGB CONCETRATION (test code=MCHC)  30.4 %  32.2-35.7  

 

 RED CELL DISTRIBUTION WIDTH (test code=RDW)  17.2 %  12.1-15.2  

 

 PLATELET COUNT (test code=PLT)  321 K/MM3  129-368  

 

 MEAN PLATELET VOLUME (test code=MPV)  9.8 fl  7.4-10.4  

 

 NEUTROPHIL % (test code=NT%)  86.9 %  43-75  

 

 IMMATURE GRANULOCYTE % (test code=IG%)  0.5 %  0.0-2.0  

 

 LYMPHOCYTE % (test code=LY%)  8.4 %  14-44  

 

 MONOCYTE % (test code=MO%)  4.1 %  4-13  

 

 EOSINOPHIL % (test code=EO%)  0.0 %  0-6  

 

 BASOPHIL % (test code=BA%)  0.1 %  0-2  

 

 NUCLEATED RBC % (test code=NRBC%)  3.0 %  0-1.0  

 

 NEUTROPHIL # (test code=NT#)  6.42 K/mm3  2.0-7.6  

 

 IMMATURE GRANULOCYTE # (test code=IG#)  0.04 x10 3/uL  0-0.03  

 

 LYMPHOCYTE # (test code=LY#)  0.62 K/mm3  1.0-3.8  

 

 MONOCYTE # (test code=MO#)  0.30 K/mm3  0.1-0.8  

 

 EOSINOPHIL # (test code=EO#)  0.00 K/mm3  0.0-0.2  

 

 BASOPHIL # (test code=BA#)  0.01 K/mm3  0.0-0.2  

 

 NUCLEATED RBC # (test code=NRBC#)  0.22 K/mm3  0.0-0.1  



DIFFERENTIAL [id number] 07:30:00





 Test Item  Value  Reference Range  Comments

 

 RBC MORPHOLOGY REQUIRED (test code=RBCM)      

 

 PLATELET ESTIMATE (test code=PLTEST)    ADEQUATE  

 

 PLATELET MORPHOLOGY (test code=PLTMORPH)    NORMAL  



CBC W/AUTO [id number] 07:30:00





 Test Item  Value  Reference Range  Comments

 

 WHITE BLOOD CELL (test code=WBC)  7.4 K/MM3  3.8-9.8  

 

 RED BLOOD CELL (test code=RBC)  3.77 M/MM3  3.58-4.97  

 

 HEMOGLOBIN (test code=HGB)  9.9 G/DL  11.2-14.9  

 

 HEMATOCRIT (test code=HCT)  32.6 %  33.2-43.5  

 

 MEAN CELL VOLUME (test code=MCV)  87 fL  80.7-99.1  

 

 MEAN CELL HGB (test code=MCH)  26.3 pg  27.0-34.1  

 

 MEAN CELL HGB CONCETRATION (test code=MCHC)  30.4 %  32.2-35.7  

 

 RED CELL DISTRIBUTION WIDTH (test code=RDW)  17.2 %  12.1-15.2  

 

 PLATELET COUNT (test code=PLT)  321 K/MM3  129-368  

 

 MEAN PLATELET VOLUME (test code=MPV)  9.8 fl  7.4-10.4  

 

 NEUTROPHIL % (test code=NT%)  86.9 %  43-75  

 

 IMMATURE GRANULOCYTE % (test code=IG%)  0.5 %  0.0-2.0  

 

 LYMPHOCYTE % (test code=LY%)  8.4 %  14-44  

 

 MONOCYTE % (test code=MO%)  4.1 %  4-13  

 

 EOSINOPHIL % (test code=EO%)  0.0 %  0-6  

 

 BASOPHIL % (test code=BA%)  0.1 %  0-2  

 

 NUCLEATED RBC % (test code=NRBC%)  3.0 %  0-1.0  

 

 NEUTROPHIL # (test code=NT#)  6.42 K/mm3  2.0-7.6  

 

 IMMATURE GRANULOCYTE # (test code=IG#)  0.04 x10 3/uL  0-0.03  

 

 LYMPHOCYTE # (test code=LY#)  0.62 K/mm3  1.0-3.8  

 

 MONOCYTE # (test code=MO#)  0.30 K/mm3  0.1-0.8  

 

 EOSINOPHIL # (test code=EO#)  0.00 K/mm3  0.0-0.2  

 

 BASOPHIL # (test code=BA#)  0.01 K/mm3  0.0-0.2  

 

 NUCLEATED RBC # (test code=NRBC#)  0.22 K/mm3  0.0-0.1  



DIFFERENTIAL [id number] 07:30:00





 Test Item  Value  Reference Range  Comments

 

 RBC MORPHOLOGY REQUIRED (test code=RBCM)      

 

 PLATELET ESTIMATE (test code=PLTEST)    ADEQUATE  

 

 PLATELET MORPHOLOGY (test code=PLTMORPH)    NORMAL  



BASIC METABOLIC [id number] 07:24:00





 Test Item  Value  Reference Range  Comments

 

 SODIUM (test code=NA)  137 MMOL/L  137-145  

 

 POTASSIUM (test code=K)  4.1 MMOL/L  3.5-5.1  

 

 CHLORIDE (test code=CL)  107 MMOL/L    

 

 CARBON DIOXIDE (test code=CO2)  15 MMOL/L  22-30  

 

 GLUCOSE (test code=GLU)  151 MG/DL    

 

 BLOOD UREA NITROGEN (test  38 MG/DL  7-17  



 code=BUN)      

 

 GLOMERULAR FILTRATION RATE  21    Reporting units: ml/min/1.73



 (test code=GFR)      m2  (Modified MDRD



       Formula)Reference Range: >



       or=60 ml/min/1.73 m2

 

 CREATININE (test code=CREAT)  2.20 MG/DL  0.52-1.04  

 

 CALCIUM (test code=CA)  8.0 MG/DL  8.4-10.2  



BASIC METABOLIC [id number] 07:23:00





 Test Item  Value  Reference Range  Comments

 

 SODIUM (test code=NA)  137 MMOL/L  137-145  

 

 POTASSIUM (test code=K)  4.1 MMOL/L  3.5-5.1  

 

 CHLORIDE (test code=CL)  107 MMOL/L    

 

 CARBON DIOXIDE (test code=CO2)   MMOL/L  22-30  

 

 GLUCOSE (test code=GLU)   MG/DL    

 

 BLOOD UREA NITROGEN (test   MG/DL  7-17  



 code=BUN)      

 

 GLOMERULAR FILTRATION RATE  21    Reporting units: ml/min/1.73



 (test code=GFR)      m2  (Modified MDRD



       Formula)Reference Range: >



       or=60 ml/min/1.73 m2

 

 CREATININE (test code=CREAT)  2.20 MG/DL  0.52-1.04  

 

 CALCIUM (test code=CA)   MG/DL  8.7-9.7  



GLYCOSYLATED HEMOGLOBIN [id number] 07:23:00





 Test Item  Value  Reference Range  Comments

 

 GLYCOSYLATED HEMOGLOBIN  6.9 %  4.8-5.9  Any condition that shortens



 (HA1C) (test code=GLYHGB)      erythocyte survival or



       decreasesmean erythrocyte age



       (e.g., recovery from acute blood



       loss,hemolytic anemia) will



       falsely lower HGBA1c



       resultsregardless of the method



       used.  HGBA1c results from



       patientswith HbSS, HbCC, and



       HbSc must be interpreted with



       cautiongiven the pathological



       processes, including



       anemia,increased red cell



       turnover, transfusion



       requirements, thatadversely



       impact HGBA1c as a marker of



       long-term glycemiccontrol.



       Alternative forms of testing



       such as fructosamineshould be



       considered for these patients.

 

 MEAN BLOOD GLUCOSE (test  151 MG/DL    



 code=MBG)      



BASIC METABOLIC [id number] 07:21:00





 Test Item  Value  Reference Range  Comments

 

 SODIUM (test code=NA)  137 MMOL/L  137-145  

 

 POTASSIUM (test code=K)  4.1 MMOL/L  3.5-5.1  

 

 CHLORIDE (test code=CL)  107 MMOL/L    

 

 CARBON DIOXIDE (test code=CO2)   MMOL/L  22-30  

 

 GLUCOSE (test code=GLU)   MG/DL    

 

 BLOOD UREA NITROGEN (test code=BUN)   MG/DL  7-17  

 

 GLOMERULAR FILTRATION RATE (test code=GFR)      

 

 CREATININE (test code=CREAT)   MG/DL  0.52-1.04  

 

 CALCIUM (test code=CA)   MG/DL  8.7-9.7  



UR SMEAR EOSINOPHIL [id number] 21:49:00





 Test Item  Value  Reference Range  Comments

 

 UR SMEAR EOSINOPHIL COUNT (test code=EOSCTU)  NONE  RARE  



UR SODIUM [id number] 19:53:00





 Test Item  Value  Reference Range  Comments

 

 UR SODIUM RANDOM (test code=TERA)  7 MMOL/L    



UR PROTEIN [id number] 19:53:00





 Test Item  Value  Reference Range  Comments

 

 UR PROTEIN RANDOM (test code=PROTU)  12 MG/DL  0-11.9  



UR CREATININE [id number] 19:53:00





 Test Item  Value  Reference Range  Comments

 

 UR CREATININE RANDOM (test code=CREATU)  139.7    



URINALYSIS [id number] 19:52:00





 Test Item  Value  Reference Range  Comments

 

 UA COLOR (test code=COLU)  YELLOW  YELLOW  

 

 UA APPEARANCE (test code=APPU)  SLIGHT CLOUDY  CLEAR  

 

 UA GLUCOSE DIPSTICK (test  250 MG/DL  NORMAL  



 code=DGLUU)      

 

 UA BILIRUBIN DIPSTICK (test  1 MG/DL  NEGATIVE  



 code=BILU)      

 

 UA KETONE DIPSTICK (test  5 MG/DL  NEGATIVE  



 code=KETU)      

 

 UA SPECIFIC GRAVITY (test  1.020  1.003-1.030  



 code=SGU)      

 

 UA BLOOD DIPSTICK (test code=MELVIN)  10 Andre/mm3  NEGATIVE  

 

 UA PH DIPSTICK (test code=VALARIE)  5.0  5.0-9.0  

 

 UA PROTEIN DIPSTICK (test  30 MG/DL  NEGATIVE  



 code=PROU)      

 

 UA UROBILINIOGEN DIPSTICK (test  NORMAL MG/DL  NORMAL  



 code=URO)      

 

 UA NITRITE DIPSTICK (test  POSITIVE  NEGATIVE  



 code=ANA ROSA)      

 

 UA LEUKOCYTE ESTERASE DIPSTICK  100 /mm3  NEGATIVE  



 (test code=LEUU)      

 

 UA CULTURE NEEDED? (test  YES,WBC>10 & EPI<25  Culture Chk  



 code=UACULT)  Criteria    



SOURCE OF URINE: CLEAN CATCHUA ICTOTEST FOR [id number] 19:52:00





 Test Item  Value  Reference Range  Comments

 

 UA ICTOTEST FOR BILIRUBIN (test code=ICTOU)  NEGATIVE  NEGATIVE  



SOURCE OF URINE: CLEAN CATCHUA [id number] 19:52:00





 Test Item  Value  Reference Range  Comments

 

 UA RBC (test code=RBCU)  0-3 RBC/HPF  0-3  

 

 UA WBC (test code=XWBCU)  10-20 WBC/HPF  0-5  

 

 UA EPITHELIAL CELLS (test code=EPIU)  FEW EPI/HPF  FEW  

 

 UA BACTERIA (test code=XBACU)  MODERATE  NONE  

 

 UA MUCUS (test code=MUCU)  SLIGHT #/LPF  NONE  



SOURCE OF URINE: CLEAN CATCHURINALYSIS [id number] 19:46:00





 Test Item  Value  Reference Range  Comments

 

 UA COLOR (test code=COLU)  YELLOW  YELLOW  

 

 UA APPEARANCE (test code=APPU)  SLIGHT CLOUDY  CLEAR  

 

 UA GLUCOSE DIPSTICK (test code=DGLUU)  250 MG/DL  NORMAL  

 

 UA BILIRUBIN DIPSTICK (test code=BILU)  1 MG/DL  NEGATIVE  

 

 UA KETONE DIPSTICK (test code=KETU)  5 MG/DL  NEGATIVE  

 

 UA SPECIFIC GRAVITY (test code=SGU)  1.020  1.003-1.030  

 

 UA BLOOD DIPSTICK (test code=MELVIN)  10 Andre/mm3  NEGATIVE  

 

 UA PH DIPSTICK (test code=VALARIE)  5.0  5.0-9.0  

 

 UA PROTEIN DIPSTICK (test code=PROU)  30 MG/DL  NEGATIVE  

 

 UA UROBILINIOGEN DIPSTICK (test code=URO)  NORMAL MG/DL  NORMAL  

 

 UA NITRITE DIPSTICK (test code=ANA ROSA)  POSITIVE  NEGATIVE  

 

 UA LEUKOCYTE ESTERASE DIPSTICK (test  100 /mm3  NEGATIVE  



 code=LEUU)      

 

 UA CULTURE NEEDED? (test code=UACULT)   Criteria  Culture Chk  



SOURCE OF URINE: CLEAN CATCHUA ICTOTEST FOR [id number] 19:46:00





 Test Item  Value  Reference Range  Comments

 

 UA ICTOTEST FOR BILIRUBIN (test code=ICTOU)    NEGATIVE  



SOURCE OF URINE: CLEAN CATCHUA [id number] 19:46:00





 Test Item  Value  Reference Range  Comments

 

 UA RBC (test code=RBCU)   RBC/HPF  0-3  

 

 UA WBC (test code=XWBCU)   WBC/HPF  0-5  

 

 UA EPITHELIAL CELLS (test code=EPIU)   EPI/HPF  FEW  

 

 UA BACTERIA (test code=XBACU)    NONE  



SOURCE OF URINE: CLEAN CATCHURINALYSIS [id number] 19:46:00





 Test Item  Value  Reference Range  Comments

 

 UA COLOR (test code=COLU)  YELLOW  YELLOW  

 

 UA APPEARANCE (test code=APPU)  SLIGHT CLOUDY  CLEAR  

 

 UA GLUCOSE DIPSTICK (test code=DGLUU)  250 MG/DL  NORMAL  

 

 UA BILIRUBIN DIPSTICK (test code=BILU)  1 MG/DL  NEGATIVE  

 

 UA KETONE DIPSTICK (test code=KETU)  5 MG/DL  NEGATIVE  

 

 UA SPECIFIC GRAVITY (test code=SGU)  1.020  1.003-1.030  

 

 UA BLOOD DIPSTICK (test code=MELVIN)  10 Andre/mm3  NEGATIVE  

 

 UA PH DIPSTICK (test code=VALARIE)  5.0  5.0-9.0  

 

 UA PROTEIN DIPSTICK (test code=PROU)  30 MG/DL  NEGATIVE  

 

 UA UROBILINIOGEN DIPSTICK (test code=URO)  NORMAL MG/DL  NORMAL  

 

 UA NITRITE DIPSTICK (test code=ANA ROSA)  POSITIVE  NEGATIVE  

 

 UA LEUKOCYTE ESTERASE DIPSTICK (test  100 /mm3  NEGATIVE  



 code=LEUU)      

 

 UA CULTURE NEEDED? (test code=UACULT)   Criteria  Culture Chk  



SOURCE OF URINE: CLEAN CATCHUA ICTOTEST FOR [id number] 19:46:00





 Test Item  Value  Reference Range  Comments

 

 UA ICTOTEST FOR BILIRUBIN (test code=ICTOU)    NEGATIVE  



SOURCE OF URINE: CLEAN CATCHUA [id number] 19:46:00





 Test Item  Value  Reference Range  Comments

 

 UA RBC (test code=RBCU)   RBC/HPF  0-3  

 

 UA WBC (test code=XWBCU)   WBC/HPF  0-5  

 

 UA EPITHELIAL CELLS (test code=EPIU)   EPI/HPF  FEW  

 

 UA BACTERIA (test code=XBACU)    NONE  



SOURCE OF URINE: CLEAN CATCHUR SODIUM [id number] 19:45:00





 Test Item  Value  Reference Range  Comments

 

 UR SODIUM RANDOM (test code=TERA)  7 MMOL/L    



UR PROTEIN [id number] 19:45:00





 Test Item  Value  Reference Range  Comments

 

 UR PROTEIN RANDOM (test code=PROTU)  12 MG/DL  0-11.9  



UR CREATININE [id number] 19:45:00





 Test Item  Value  Reference Range  Comments

 

 UR CREATININE RANDOM (test code=CREATU)      



GLUCOSE BEDSIDE [id number] 19:44:00





 Test Item  Value  Reference Range  Comments

 

 GLUCOSE BEDSIDE TESTING (test code=GLUBED)  126 MG/DL  60-99  



UR SODIUM [id number] 19:43:00





 Test Item  Value  Reference Range  Comments

 

 UR SODIUM RANDOM (test code=TERA)  7 MMOL/L    



UR PROTEIN [id number] 19:43:00





 Test Item  Value  Reference Range  Comments

 

 UR PROTEIN RANDOM (test code=PROTU)   MG/DL  0-11.9  



UR CREATININE [id number] 19:43:00





 Test Item  Value  Reference Range  Comments

 

 UR CREATININE RANDOM (test code=CREATU)      



GLUCOSE BEDSIDE [id number] 18:12:00





 Test Item  Value  Reference Range  Comments

 

 GLUCOSE BEDSIDE TESTING (test code=GLUBED)  104 MG/DL  60-99  



ISTAT BLOOD [id number] 11:23:00





 Test Item  Value  Reference Range  Comments

 

 ISTAT-PH ARTERIAL (test code=PHAP)  7.254  7.32-7.50  

 

 ISTAT-PCO2 ARTERIAL (test code=PCO2AP)  35.7 MMHG  27.0-40.0  

 

 ISTAT-PO2 ARTERIAL (test code=PO2AP)  33 MMHG  80.0-100.0  

 

 ISTAT-HCO3 ARTERIAL (test code=HCO3AP)  15.8 MMOL/L  18-23  

 

 ISTAT-BASE EXCESS ARTERIAL (test code=BEAP)  -11 MMOL/L    

 

 ISTAT-SO2 ARTERIAL (test code=SO2AP)  54 %  95-98  

 

 IONIZED CALCIUM (test code=CAIABG)  1.09 MMOL/L  1.12-1.24  

 

 ISTAT-SODIUM (test code=NAP)  140 MMOL/L  137-144  

 

 ISTAT-POTASSIUM (test code=KP)  3.7 MMOL/L  3.1-4.8  

 

 ISTAT-GLUCOSE (test code=GLUP)  70 MG/DL  60-99  



ISTAT BLOOD [id number] 11:23:00





 Test Item  Value  Reference Range  Comments

 

 ISTAT-PH ARTERIAL (test code=PHAP)  7.258  7.32-7.50  

 

 ISTAT-PCO2 ARTERIAL (test code=PCO2AP)  33.2 MMHG  27.0-40.0  

 

 ISTAT-PO2 ARTERIAL (test code=PO2AP)  72 MMHG  80.0-100.0  

 

 ISTAT-HCO3 ARTERIAL (test code=HCO3AP)  14.8 MMOL/L    

 

 ISTAT-BASE EXCESS ARTERIAL (test code=BEAP)  -12 MMOL/L    

 

 ISTAT-SO2 ARTERIAL (test code=SO2AP)  92 %  95-98  

 

 IONIZED CALCIUM (test code=CAIABG)  1.11 MMOL/L  1.12-1.24  

 

 ISTAT-SODIUM (test code=NAP)  140 MMOL/L  137-144  

 

 ISTAT-POTASSIUM (test code=KP)  3.7 MMOL/L  3.1-4.8  

 

 ISTAT-GLUCOSE (test code=GLUP)  71 MG/DL  60-99  



COMPREHENSIVE METABOLIC [id number] 06:57:00





 Test Item  Value  Reference Range  Comments

 

 SODIUM (test code=NA)  139 MMOL/L  137-145  

 

 POTASSIUM (test code=K)  4.0 MMOL/L  3.5-5.1  

 

 CHLORIDE (test code=CL)  108 MMOL/L    

 

 CARBON DIOXIDE (test code=CO2)  13 MMOL/L  22-30  

 

 GLUCOSE (test code=GLU)  67 MG/DL    

 

 BLOOD UREA NITROGEN (test  35 MG/DL  7-17  



 code=BUN)      

 

 GLOMERULAR FILTRATION RATE  22    Reporting units: ml/min/1.73



 (test code=GFR)      m2  (Modified MDRD



       Formula)Reference Range: >



       or=60 ml/min/1.73 m2

 

 CREATININE (test code=CREAT)  2.10 MG/DL  0.52-1.04  

 

 TOTAL PROTEIN (test code=PROT)  6.6 G/DL  6.3-8.2  

 

 ALBUMIN (test code=ALB)  3.3 G/DL  3.5-5.0  

 

 CALCIUM (test code=CA)  8.2 MG/DL  8.4-10.2  

 

 BILIRUBIN TOTAL (test  0.8 MG/DL  0.2-1.3  



 code=BILT)      

 

 SGOT/AST (test code=AST)  45 UNITS/L  14-36  

 

 SGPT/ALT (test code=ALT)  14 UNITS/L  <35  

 

 ALKALINE PHOSPHATASE (test  73 UNITS/L    



 code=ALKP)      



COMPREHENSIVE METABOLIC [id number] 06:56:00





 Test Item  Value  Reference Range  Comments

 

 SODIUM (test code=NA)  139 MMOL/L  137-145  

 

 POTASSIUM (test code=K)  4.0 MMOL/L  3.5-5.1  

 

 CHLORIDE (test code=CL)  108 MMOL/L    

 

 CARBON DIOXIDE (test code=CO2)  13 MMOL/L  22-30  

 

 GLUCOSE (test code=GLU)   MG/DL    

 

 BLOOD UREA NITROGEN (test   MG/DL  7-17  



 code=BUN)      

 

 GLOMERULAR FILTRATION RATE  22    Reporting units: ml/min/1.73



 (test code=GFR)      m2  (Modified MDRD



       Formula)Reference Range: >



       or=60 ml/min/1.73 m2

 

 CREATININE (test code=CREAT)  2.10 MG/DL  0.52-1.04  

 

 TOTAL PROTEIN (test code=PROT)  6.6 G/DL  6.3-8.2  

 

 ALBUMIN (test code=ALB)  3.3 G/DL  3.5-5.0  

 

 CALCIUM (test code=CA)   MG/DL  8.7-9.7  

 

 BILIRUBIN TOTAL (test  0.8 MG/DL  0.2-1.3  



 code=BILT)      

 

 SGOT/AST (test code=AST)  45 UNITS/L  14-36  

 

 SGPT/ALT (test code=ALT)   UNITS/L  <35  

 

 ALKALINE PHOSPHATASE (test   UNITS/L    



 code=ALKP)      



COMPREHENSIVE METABOLIC [id number] 06:54:00





 Test Item  Value  Reference Range  Comments

 

 SODIUM (test code=NA)  139 MMOL/L  137-145  

 

 POTASSIUM (test code=K)  4.0 MMOL/L  3.5-5.1  

 

 CHLORIDE (test code=CL)  108 MMOL/L    

 

 CARBON DIOXIDE (test code=CO2)   MMOL/L  22-30  

 

 GLUCOSE (test code=GLU)   MG/DL    

 

 BLOOD UREA NITROGEN (test code=BUN)   MG/DL  7-17  

 

 GLOMERULAR FILTRATION RATE (test code=GFR)      

 

 CREATININE (test code=CREAT)   MG/DL  0.52-1.04  

 

 TOTAL PROTEIN (test code=PROT)   G/DL  6.3-8.2  

 

 ALBUMIN (test code=ALB)  3.3 G/DL  3.5-5.0  

 

 CALCIUM (test code=CA)   MG/DL  8.7-9.7  

 

 BILIRUBIN TOTAL (test code=BILT)   MG/DL  0.2-1.3  

 

 SGOT/AST (test code=AST)   UNITS/L  15-37  

 

 SGPT/ALT (test code=ALT)   UNITS/L  <35  

 

 ALKALINE PHOSPHATASE (test code=ALKP)   UNITS/L    



COMPREHENSIVE METABOLIC [id number] 06:53:00





 Test Item  Value  Reference Range  Comments

 

 SODIUM (test code=NA)   MMOL/L  137-145  

 

 POTASSIUM (test code=K)   MMOL/L  3.5-5.1  

 

 CHLORIDE (test code=CL)   MMOL/L    

 

 CARBON DIOXIDE (test code=CO2)   MMOL/L  22-30  

 

 GLUCOSE (test code=GLU)   MG/DL    

 

 BLOOD UREA NITROGEN (test code=BUN)   MG/DL  7-17  

 

 GLOMERULAR FILTRATION RATE (test code=GFR)      

 

 CREATININE (test code=CREAT)   MG/DL  0.52-1.04  

 

 TOTAL PROTEIN (test code=PROT)   G/DL  6.3-8.2  

 

 ALBUMIN (test code=ALB)  3.3 G/DL  3.5-5.0  

 

 CALCIUM (test code=CA)   MG/DL  8.7-9.7  

 

 BILIRUBIN TOTAL (test code=BILT)   MG/DL  0.2-1.3  

 

 SGOT/AST (test code=AST)   UNITS/L  15-37  

 

 SGPT/ALT (test code=ALT)   UNITS/L  <35  

 

 ALKALINE PHOSPHATASE (test code=ALKP)   UNITS/L    



CBC W/AUTO [id number] 06:10:00





 Test Item  Value  Reference Range  Comments

 

 WHITE BLOOD CELL (test code=WBC)  9.4 K/MM3  3.8-9.8  

 

 RED BLOOD CELL (test code=RBC)  3.94 M/MM3  3.58-4.97  

 

 HEMOGLOBIN (test code=HGB)  10.4 G/DL  11.2-14.9  

 

 HEMATOCRIT (test code=HCT)  34.2 %  33.2-43.5  

 

 MEAN CELL VOLUME (test code=MCV)  87 fL  80.7-99.1  

 

 MEAN CELL HGB (test code=MCH)  26.4 pg  27.0-34.1  

 

 MEAN CELL HGB CONCETRATION (test code=MCHC)  30.4 %  32.2-35.7  

 

 RED CELL DISTRIBUTION WIDTH (test code=RDW)  17.0 %  12.1-15.2  

 

 PLATELET COUNT (test code=PLT)  339 K/MM3  129-368  

 

 MEAN PLATELET VOLUME (test code=MPV)  9.5 fl  7.4-10.4  

 

 NEUTROPHIL % (test code=NT%)  80.7 %  43-75  

 

 IMMATURE GRANULOCYTE % (test code=IG%)  0.5 %  0.0-2.0  

 

 LYMPHOCYTE % (test code=LY%)  11.3 %  14-44  

 

 MONOCYTE % (test code=MO%)  7.2 %  4-13  

 

 EOSINOPHIL % (test code=EO%)  0.2 %  0-6  

 

 BASOPHIL % (test code=BA%)  0.1 %  0-2  

 

 NUCLEATED RBC % (test code=NRBC%)  0.2 %  0-1.0  

 

 NEUTROPHIL # (test code=NT#)  7.58 K/mm3  2.0-7.6  

 

 IMMATURE GRANULOCYTE # (test code=IG#)  0.05 x10 3/uL  0-0.03  

 

 LYMPHOCYTE # (test code=LY#)  1.06 K/mm3  1.0-3.8  

 

 MONOCYTE # (test code=MO#)  0.68 K/mm3  0.1-0.8  

 

 EOSINOPHIL # (test code=EO#)  0.02 K/mm3  0.0-0.2  

 

 BASOPHIL # (test code=BA#)  0.01 K/mm3  0.0-0.2  

 

 NUCLEATED RBC # (test code=NRBC#)  0.02 K/mm3  0.0-0.1